# Patient Record
Sex: FEMALE | Race: WHITE | Employment: PART TIME | URBAN - METROPOLITAN AREA
[De-identification: names, ages, dates, MRNs, and addresses within clinical notes are randomized per-mention and may not be internally consistent; named-entity substitution may affect disease eponyms.]

---

## 2017-02-02 ENCOUNTER — SURG/PROC ORDERS (OUTPATIENT)
Dept: ANESTHESIOLOGY | Age: 57
End: 2017-02-02

## 2017-02-02 RX ORDER — SODIUM CHLORIDE 0.9 % (FLUSH) 0.9 %
10 SYRINGE (ML) INJECTION PRN
Status: CANCELLED | OUTPATIENT
Start: 2017-02-02

## 2017-02-02 RX ORDER — SODIUM CHLORIDE 0.9 % (FLUSH) 0.9 %
10 SYRINGE (ML) INJECTION EVERY 12 HOURS SCHEDULED
Status: CANCELLED | OUTPATIENT
Start: 2017-02-02

## 2017-02-02 RX ORDER — SODIUM CHLORIDE 9 MG/ML
INJECTION, SOLUTION INTRAVENOUS CONTINUOUS
Status: CANCELLED | OUTPATIENT
Start: 2017-02-02

## 2017-02-03 ENCOUNTER — HOSPITAL ENCOUNTER (OUTPATIENT)
Dept: ENDOSCOPY | Age: 57
Discharge: HOME | End: 2017-02-03
Attending: INTERNAL MEDICINE | Admitting: INTERNAL MEDICINE

## 2017-02-03 VITALS
DIASTOLIC BLOOD PRESSURE: 70 MMHG | OXYGEN SATURATION: 98 % | RESPIRATION RATE: 16 BRPM | HEART RATE: 63 BPM | TEMPERATURE: 98.3 F | SYSTOLIC BLOOD PRESSURE: 93 MMHG

## 2017-02-03 RX ORDER — SODIUM CHLORIDE 0.9 % (FLUSH) 0.9 %
10 SYRINGE (ML) INJECTION EVERY 12 HOURS SCHEDULED
Status: DISCONTINUED | OUTPATIENT
Start: 2017-02-03 | End: 2017-02-04 | Stop reason: HOSPADM

## 2017-02-03 RX ORDER — SODIUM CHLORIDE 0.9 % (FLUSH) 0.9 %
10 SYRINGE (ML) INJECTION PRN
Status: DISCONTINUED | OUTPATIENT
Start: 2017-02-03 | End: 2017-02-04 | Stop reason: HOSPADM

## 2017-02-03 RX ORDER — SODIUM CHLORIDE 9 MG/ML
INJECTION, SOLUTION INTRAVENOUS CONTINUOUS
Status: DISCONTINUED | OUTPATIENT
Start: 2017-02-03 | End: 2017-02-04 | Stop reason: HOSPADM

## 2017-02-03 RX ADMIN — SODIUM CHLORIDE: 9 INJECTION, SOLUTION INTRAVENOUS at 08:19

## 2017-02-03 ASSESSMENT — PAIN - FUNCTIONAL ASSESSMENT: PAIN_FUNCTIONAL_ASSESSMENT: 0-10

## 2017-02-03 ASSESSMENT — ENCOUNTER SYMPTOMS: SHORTNESS OF BREATH: 0

## 2017-02-03 ASSESSMENT — PAIN SCALES - GENERAL
PAINLEVEL_OUTOF10: 0

## 2017-05-16 ENCOUNTER — EMPLOYEE WELLNESS (OUTPATIENT)
Dept: OTHER | Age: 57
End: 2017-05-16

## 2017-05-16 LAB
CHOLESTEROL, TOTAL: 162 MG/DL (ref 0–199)
GLUCOSE BLD-MCNC: 81 MG/DL (ref 70–99)
HDLC SERPL-MCNC: 44 MG/DL (ref 40–60)
LDL CHOLESTEROL CALCULATED: 96 MG/DL
TRIGL SERPL-MCNC: 111 MG/DL (ref 0–150)

## 2017-09-12 ENCOUNTER — OFFICE VISIT (OUTPATIENT)
Dept: FAMILY MEDICINE CLINIC | Age: 57
End: 2017-09-12

## 2017-09-12 VITALS
SYSTOLIC BLOOD PRESSURE: 120 MMHG | HEART RATE: 68 BPM | BODY MASS INDEX: 28.09 KG/M2 | TEMPERATURE: 98.4 F | WEIGHT: 174.8 LBS | HEIGHT: 66 IN | DIASTOLIC BLOOD PRESSURE: 86 MMHG

## 2017-09-12 DIAGNOSIS — G47.00 INSOMNIA, UNSPECIFIED TYPE: Primary | ICD-10-CM

## 2017-09-12 DIAGNOSIS — Z86.19 HISTORY OF HEPATITIS C: ICD-10-CM

## 2017-09-12 LAB
ALBUMIN SERPL-MCNC: 4.3 G/DL (ref 3.4–5)
ALP BLD-CCNC: 77 U/L (ref 40–129)
ALT SERPL-CCNC: 26 U/L (ref 10–40)
AST SERPL-CCNC: 18 U/L (ref 15–37)
BILIRUB SERPL-MCNC: 0.3 MG/DL (ref 0–1)
BILIRUBIN DIRECT: <0.2 MG/DL (ref 0–0.3)
BILIRUBIN, INDIRECT: NORMAL MG/DL (ref 0–1)
TOTAL PROTEIN: 6.5 G/DL (ref 6.4–8.2)

## 2017-09-12 PROCEDURE — 99202 OFFICE O/P NEW SF 15 MIN: CPT | Performed by: FAMILY MEDICINE

## 2017-09-12 ASSESSMENT — ENCOUNTER SYMPTOMS
SHORTNESS OF BREATH: 0
ABDOMINAL DISTENTION: 0
ABDOMINAL PAIN: 0
BLOOD IN STOOL: 0
VOMITING: 0
NAUSEA: 0
DIARRHEA: 0
COUGH: 0
CONSTIPATION: 0
CHEST TIGHTNESS: 0

## 2017-10-04 ENCOUNTER — OFFICE VISIT (OUTPATIENT)
Dept: ORTHOPEDIC SURGERY | Age: 57
End: 2017-10-04

## 2017-10-04 VITALS
SYSTOLIC BLOOD PRESSURE: 120 MMHG | DIASTOLIC BLOOD PRESSURE: 88 MMHG | HEART RATE: 66 BPM | RESPIRATION RATE: 16 BRPM | BODY MASS INDEX: 27.8 KG/M2 | WEIGHT: 173 LBS | HEIGHT: 66 IN

## 2017-10-04 DIAGNOSIS — M25.551 HIP PAIN, ACUTE, RIGHT: Primary | ICD-10-CM

## 2017-10-04 DIAGNOSIS — M25.561 ACUTE PAIN OF RIGHT KNEE: ICD-10-CM

## 2017-10-04 DIAGNOSIS — M54.31 SCIATICA OF RIGHT SIDE: ICD-10-CM

## 2017-10-04 PROCEDURE — 99203 OFFICE O/P NEW LOW 30 MIN: CPT | Performed by: ORTHOPAEDIC SURGERY

## 2017-10-04 NOTE — MR AVS SNAPSHOT
After Visit Summary             Michelle Hsu   10/4/2017 10:15 AM   Office Visit    Description:  Female : 1960   Provider:  Britt Ramsay MD   Department:  [address] [facility]              Your Follow-Up and Future Appointments         Below is a list of your follow-up and future appointments. This may not be a complete list as you may have made appointments directly with providers that we are not aware of or your providers may have made some for you. Please call your providers to confirm appointments. It is important to keep your appointments. Please bring your current insurance card, photo ID, co-pay, and all medication bottles to your appointment. If self-pay, payment is expected at the time of service. Your To-Do List     Follow-Up    Return if symptoms worsen or fail to improve. Information from Your Visit        Department     Name Address Phone Fax    [address] [facility] 0[address] [facility]) [address] [place] [address] 38. [phone]      You Were Seen for:         Comments    Hip pain, acute, right   [[id number]]         Vital Signs     Blood Pressure Pulse Respirations Height Weight Last Menstrual Period    120/88 66 16 5' 6\" (1.676 m) 173 lb (78.5 kg) 2015    Body Mass Index Smoking Status                27.92 kg/m2 Former Smoker          Additional Information about your Body Mass Index (BMI)           Your BMI as listed above is considered overweight (25.0-29.9). BMI is an estimate of body fat, calculated from your height and weight. The higher your BMI, the greater your risk of heart disease, high blood pressure, type 2 diabetes, stroke, gallstones, arthritis, sleep apnea, and certain cancers. BMI is not perfect. It may overestimate body fat in athletes and people who are more muscular.   If your body fat is high you can improve

## 2017-10-05 RX ORDER — METHYLPREDNISOLONE 4 MG/1
TABLET ORAL
Qty: 1 KIT | Refills: 0 | Status: SHIPPED | OUTPATIENT
Start: 2017-10-05 | End: 2017-10-10

## 2018-01-30 ENCOUNTER — TELEPHONE (OUTPATIENT)
Dept: FAMILY MEDICINE CLINIC | Age: 58
End: 2018-01-30

## 2018-01-30 DIAGNOSIS — G47.00 INSOMNIA, UNSPECIFIED TYPE: Primary | ICD-10-CM

## 2018-01-30 NOTE — TELEPHONE ENCOUNTER
I am not able to refill a request for the sleeping pill  She is getting that and other medications for sedation from other doctors  She will need to contact them

## 2018-01-31 RX ORDER — ESZOPICLONE 2 MG/1
2 TABLET, FILM COATED ORAL NIGHTLY
Qty: 30 TABLET | Refills: 1 | Status: SHIPPED | OUTPATIENT
Start: 2018-01-31 | End: 2018-04-27 | Stop reason: SDUPTHER

## 2018-01-31 NOTE — TELEPHONE ENCOUNTER
Med refilled    Pharmacy called  She had refills left on her script  Noted on the [place] record

## 2018-02-06 ENCOUNTER — TELEPHONE (OUTPATIENT)
Dept: FAMILY MEDICINE CLINIC | Age: 58
End: 2018-02-06

## 2018-02-06 NOTE — TELEPHONE ENCOUNTER
Prescription Question     From  Caroline Mccartney To Sent  2/6/2018  3:40 PM   I would like to request that all of my prescription refills and any new prescriptions be sent to [facility].    E-Prescription: [facility] ([facility]"   Fax: [phone]   Phone: [phone] or [phone]   Thank You,   Caroline Mccartney

## 2018-02-08 ENCOUNTER — OFFICE VISIT (OUTPATIENT)
Dept: ORTHOPEDIC SURGERY | Age: 58
End: 2018-02-08

## 2018-02-08 VITALS
DIASTOLIC BLOOD PRESSURE: 71 MMHG | SYSTOLIC BLOOD PRESSURE: 109 MMHG | WEIGHT: 179 LBS | HEART RATE: 76 BPM | RESPIRATION RATE: 12 BRPM | BODY MASS INDEX: 28.77 KG/M2 | HEIGHT: 66 IN

## 2018-02-08 DIAGNOSIS — M25.562 ACUTE PAIN OF LEFT KNEE: ICD-10-CM

## 2018-02-08 DIAGNOSIS — M22.42 CHONDROMALACIA PATELLA, LEFT: Primary | ICD-10-CM

## 2018-02-08 DIAGNOSIS — S83.282A TEAR OF LATERAL MENISCUS OF LEFT KNEE, CURRENT, UNSPECIFIED TEAR TYPE, INITIAL ENCOUNTER: ICD-10-CM

## 2018-02-08 PROCEDURE — 20610 DRAIN/INJ JOINT/BURSA W/O US: CPT | Performed by: ORTHOPAEDIC SURGERY

## 2018-02-08 PROCEDURE — 99214 OFFICE O/P EST MOD 30 MIN: CPT | Performed by: ORTHOPAEDIC SURGERY

## 2018-02-08 RX ORDER — TRAMADOL HYDROCHLORIDE 50 MG/1
50 TABLET ORAL DAILY
COMMUNITY
End: 2020-03-02 | Stop reason: ALTCHOICE

## 2018-02-08 ASSESSMENT — ENCOUNTER SYMPTOMS
RESPIRATORY NEGATIVE: 1
ALLERGIC/IMMUNOLOGIC NEGATIVE: 1
GASTROINTESTINAL NEGATIVE: 1

## 2018-02-08 NOTE — PROGRESS NOTES
Subjective:      Patient ID: Kristina Mercado is a 63 y.o. female. HPI   Kristina Mercado presents today for evaluation of her left knee. 6 days ago she was trying to do a sit up on an exercise ball when she fell off twisting her left knee awkwardly. She had significant popping and pain laterally. She has use diclofenac and ice. She has also taken some tramadol. She continues to have pain. Pain is 3 out of 10. She was seen by physical therapist and exercises were recommended. Those have helped a little bit. She has significant pain extending the knee when she descends stairs. She has not had significant prior left knee pain. She is otherwise healthy, but does have a history of hepatitis C. Review of Systems   Constitutional: Negative. HENT: Negative. Eyes: Positive for visual disturbance. Cataracts, is being seen   Respiratory: Negative. Cardiovascular: Negative. Gastrointestinal: Negative. Endocrine: Negative. Genitourinary: Negative. Musculoskeletal: Negative. Allergic/Immunologic: Negative. Neurological: Negative. Hematological: Negative. Psychiatric/Behavioral: Positive for sleep disturbance. Medicated for insomnia       Objective:   Physical Exam  General Exam:    Vitals: Blood pressure 109/71, pulse 76, resp. rate 12, height 5' 6\" (1.676 m), weight 179 lb (81.2 kg), last menstrual period 04/21/2015. Constitutional: Patient is adequately groomed with no evidence of malnutrition  Mental Status: The patient is oriented to time, place and person. The patient's mood and affect are appropriate. Gait:  Patient walks with normal gait and station. Lymphatic: The lymphatic examination bilaterally reveals all areas to be without enlargement or induration. Vascular: Examination reveals no swelling or calf tenderness. Peripheral pulses are palpable and 2+. Neurological: The patient has good coordination. There is no weakness or sensory deficit.     Skin: Head/Neck: inspection reveals no rashes, ulcerations or lesions. Trunk:  inspection reveals no rashes, ulcerations or lesions. Right Lower Extremity: inspection reveals no rashes, ulcerations or lesions. Left Lower Extremity: inspection reveals no rashes, ulcerations or lesions. Examination of the bilateral hips reveals normal flexion and extension. There is no restriction in rotation. There is no tenderness to palpation anteriorly posteriorly or laterally. Right knee examination demonstrates no effusion. There is no tenderness to palpation over the medial or lateral joint line. There is no discomfort over the patellar tendon. There is no palpable popliteal cyst.  Sensation is intact. Range of motion is normal.  There is no patellofemoral crepitation. There is no instability to varus or  valgus stress applied at 0, 30, 60, or 90° of flexion. There is no anterior or posterior drawer. Lachman examination is normal.  Left knee exam demonstrates no effusion. She is moderate to significant patellofemoral crepitation. She has some mild tenderness laterally. She has pain at terminal knee flexion but no pain and extension. She is mild discomfort with Jazmin's maneuver laterally. She is ligamentously stable throughout. Calf is soft without DVT. X-rays were obtained today. AP standing, PA flexed, and merchant views of the bilateral knees as well as a lateral of the left knee. These demonstrate:  Mild patellofemoral narrowing but no other acute bony abnormalities    Assessment:      Left knee chondromalacia patella versus left knee lateral meniscus tear      Plan: We are going to start with conservative management. We will try cortisone injection. She'll continue with the exercises as directed by her physical therapist.  If her pain is persistent in 1 month we'll proceed with an MRI to evaluate lateral meniscus. She agrees with this plan.     Procedure: Under sterile technique, the left

## 2018-02-16 ENCOUNTER — TELEPHONE (OUTPATIENT)
Dept: FAMILY MEDICINE CLINIC | Age: 58
End: 2018-02-16

## 2018-02-16 NOTE — TELEPHONE ENCOUNTER
Kim Black  Female, 63 y. o., 1960  Weight:   179 lb (81.2 kg)  Phone:   *OTHER [phone]; R:[phone]; U:[phone]  [address] [place] [address],[address] Phone:   [phone]  PCP:   Adarsh Dc MD  Allergies  No Known Allergies  Health Maintenance:   Due  87 Rulashawn Mcconnell Patient (PHP)  Primary Ins:   MEDICAL  MRN:   [id number]  MyChart: Active  Next Appt:   None  Pt Comm Pref:   MyChart [2]  Prescription Question     From  Vidmind To Sent  2018  1:28 PM   recently changed my pharmacy from  [facility] to [facility] sent them all of my information but they said they need my physician to send them all of my prescriptions so they can start the process of filling them for 90 days. I take diclfenac 75mg bid, and eszopiclone 2 to 3 mg at bedtime as well as Tramadol 50 mg 1-2 q12h prn pain. I have only seen Dr. Analy Luke one time, that was to get established because my former pcp was retiring. I gave him all of the information on my current meds at that time so it should be in my chart. I have only 2 weeks left on my diclofenac and when I spoke to [facility] she said that should be ample time to get it filled and mailed out. She also said that they like to start all meds at the same time (since I'm  new to them) that way everything is due at the same time. This will be for all maintenance meds, anything that needs filled right away can still go to [facility]. Thank you for your consideration. Cih Gaetano    Encounter Messages     Read Composed From To Subject   Y 2018  1:28 PM Karo Wiggins MD Prescription Question      Prescription Question     Karo Wiggins MD 56 minutes ago (1:28 PM)         recently changed my pharmacy from [facility] to [facility].  I sent them all of my information but they said they need my physician to send them all of my prescriptions so they can start the process of filling them for 90 days.  I take

## 2018-02-22 ENCOUNTER — TELEPHONE (OUTPATIENT)
Dept: FAMILY MEDICINE CLINIC | Age: 58
End: 2018-02-22

## 2018-02-22 RX ORDER — DICLOFENAC SODIUM 75 MG/1
75 TABLET, DELAYED RELEASE ORAL 2 TIMES DAILY WITH MEALS
Qty: 180 TABLET | Refills: 0 | Status: SHIPPED | OUTPATIENT
Start: 2018-02-22 | End: 2018-06-20 | Stop reason: SDUPTHER

## 2018-02-22 NOTE — TELEPHONE ENCOUNTER
Prescription Question     From  Sammy Mcnair To Sent  2/22/2018  1:48 PM   Could you please let Sunitha Silva know that I need a refill on my diclofenac. I called them and they said they had not received anything from your office as of yet. I am leaving for vacation next Wednesday and will be gone for about two and a half weeks.

## 2018-03-20 VITALS — WEIGHT: 170 LBS | BODY MASS INDEX: 27.44 KG/M2

## 2018-04-27 ENCOUNTER — CLINICAL DOCUMENTATION (OUTPATIENT)
Dept: FAMILY MEDICINE CLINIC | Age: 58
End: 2018-04-27

## 2018-04-27 DIAGNOSIS — G47.00 INSOMNIA, UNSPECIFIED TYPE: ICD-10-CM

## 2018-04-27 RX ORDER — ESZOPICLONE 2 MG/1
2 TABLET, FILM COATED ORAL NIGHTLY
Qty: 30 TABLET | Refills: 1 | Status: SHIPPED | OUTPATIENT
Start: 2018-04-27 | End: 2018-06-26

## 2018-04-27 RX ORDER — ESZOPICLONE 2 MG/1
TABLET, FILM COATED ORAL
Qty: 30 TABLET | Refills: 0 | OUTPATIENT
Start: 2018-04-27

## 2018-05-24 ENCOUNTER — EMPLOYEE WELLNESS (OUTPATIENT)
Dept: OTHER | Age: 58
End: 2018-05-24

## 2018-05-24 LAB
CHOLESTEROL, TOTAL: 179 MG/DL (ref 0–199)
GLUCOSE BLD-MCNC: 76 MG/DL (ref 70–99)
HDLC SERPL-MCNC: 51 MG/DL (ref 40–60)
LDL CHOLESTEROL CALCULATED: 110 MG/DL
TRIGL SERPL-MCNC: 92 MG/DL (ref 0–150)

## 2018-05-29 VITALS — BODY MASS INDEX: 28.41 KG/M2 | WEIGHT: 176 LBS

## 2018-06-20 RX ORDER — DICLOFENAC SODIUM 75 MG/1
TABLET, DELAYED RELEASE ORAL
Qty: 180 TABLET | Refills: 0 | Status: SHIPPED | OUTPATIENT
Start: 2018-06-20 | End: 2018-10-02 | Stop reason: SDUPTHER

## 2018-07-13 ENCOUNTER — OFFICE VISIT (OUTPATIENT)
Dept: ORTHOPEDIC SURGERY | Age: 58
End: 2018-07-13

## 2018-07-13 ENCOUNTER — TELEPHONE (OUTPATIENT)
Dept: ORTHOPEDIC SURGERY | Age: 58
End: 2018-07-13

## 2018-07-13 VITALS
DIASTOLIC BLOOD PRESSURE: 63 MMHG | HEART RATE: 60 BPM | SYSTOLIC BLOOD PRESSURE: 116 MMHG | RESPIRATION RATE: 16 BRPM | BODY MASS INDEX: 28.77 KG/M2 | HEIGHT: 66 IN | WEIGHT: 179 LBS

## 2018-07-13 DIAGNOSIS — M70.61 TROCHANTERIC BURSITIS OF RIGHT HIP: Primary | ICD-10-CM

## 2018-07-13 PROCEDURE — 99214 OFFICE O/P EST MOD 30 MIN: CPT | Performed by: ORTHOPAEDIC SURGERY

## 2018-07-13 PROCEDURE — 20610 DRAIN/INJ JOINT/BURSA W/O US: CPT | Performed by: ORTHOPAEDIC SURGERY

## 2018-07-13 RX ORDER — METHYLPREDNISOLONE 4 MG/1
TABLET ORAL
Qty: 1 KIT | Refills: 0 | Status: SHIPPED | OUTPATIENT
Start: 2018-07-13 | End: 2018-07-19

## 2018-07-24 ENCOUNTER — TELEPHONE (OUTPATIENT)
Dept: FAMILY MEDICINE CLINIC | Age: 58
End: 2018-07-24

## 2018-07-24 RX ORDER — ESZOPICLONE 2 MG/1
TABLET, FILM COATED ORAL
Qty: 30 TABLET | Refills: 0 | OUTPATIENT
Start: 2018-07-24

## 2018-07-24 NOTE — TELEPHONE ENCOUNTER
Prescription Question     From  Andra Quiles To  Psychiatric Practice Staff Sent  7/24/2018  8:31 AM   I need a refill on my eszopiclone. I have about 3 or 4 left.    Thank you,     Lamar Sarmiento

## 2018-07-26 PROBLEM — M70.61 TROCHANTERIC BURSITIS OF RIGHT HIP: Status: ACTIVE | Noted: 2018-07-26

## 2018-07-26 NOTE — PROGRESS NOTES
CHIEF COMPLAINT: Right lateral hip pain/trochanetric busritis. HISTORY:  Ms. Mariam Minor 63 y.o.  female presents today for evaluation of right lateral hip pain which started 2017.  She is complaining of achy pain. Pain is increase with standing and walking and decrease with rest. Pain is sharp early in the morning with first few steps, dull achy pain by the end of the day. No radiation and no numbness and tingling sensation. No other complaint. No h/o trauma or gout. She is a RN at [employer].  The patient is known to me for right sciatica, seen in 10/2017     Past Medical History:   Diagnosis Date    Hepatitis     Right tennis elbow     Trochanteric bursitis of right hip 7/26/2018       Past Surgical History:   Procedure Laterality Date    COLONOSCOPY  02/03/2017    polyp dr Nithin He repeat 5 years    TUBAL LIGATION      WISDOM TOOTH EXTRACTION         Social History     Social History    Marital status:      Spouse name: N/A    Number of children: N/A    Years of education: N/A     Occupational History    RN      Social History Main Topics    Smoking status: Former Smoker     Start date: 9/12/1976     Quit date: 9/12/1990    Smokeless tobacco: Never Used    Alcohol use No    Drug use: No    Sexual activity: Yes     Partners: Male     Other Topics Concern    Not on file     Social History Narrative    No narrative on file       Family History   Problem Relation Age of Onset    Cancer Mother         breast    Heart Disease Mother     Cancer Father         lung    Arthritis Other     Asthma Other     Cancer Brother         esophageal    Diabetes Brother     Cancer Maternal Grandfather         lung     No Known Problems Sister     No Known Problems Maternal Aunt     No Known Problems Maternal Uncle     No Known Problems Paternal Aunt     No Known Problems Paternal Uncle     No Known Problems Maternal Grandmother     No Known Problems Paternal Grandmother     No Known minimal degenerative changes, no fracture. IMPRESSION: Right trochanteric bursitis. PLAN: I discussed with the patient the treatment options including both surgical and non-surgical treatment. We recommended stretching exercises of the calf and hamstrings which was taught to the patient today. She will take NSAIDS PRN. I believe she will benefit from cortisone injection right hip trochanteric bursa, and she agreed to have. PROCEDURE:    With the patient's permission, and under sterile condition, the right hip trochanteric bursitis area was prepared and draped with alcohol and injected with a mixture of 1 ml Kenalog 40mg and 4 ml of 1% lidocaine. The patient tolerated the procedure well. A band-aid was applied and the patient was advised to ice the big toe for 15-20 minutes to relieve any injection site related pain. She reported a good improvement immediatly after the injection. For her sciatica, she would like to try Meds, and we will give Medrol dose pack. F/U with our spine team.     F/u in 2-3 months, PT if needed.        Jerrell Calhoun MD

## 2018-07-27 ENCOUNTER — OFFICE VISIT (OUTPATIENT)
Dept: FAMILY MEDICINE CLINIC | Age: 58
End: 2018-07-27

## 2018-07-27 VITALS
HEIGHT: 66 IN | BODY MASS INDEX: 27.1 KG/M2 | TEMPERATURE: 98.1 F | DIASTOLIC BLOOD PRESSURE: 74 MMHG | SYSTOLIC BLOOD PRESSURE: 116 MMHG | WEIGHT: 168.6 LBS

## 2018-07-27 DIAGNOSIS — Z86.19 HISTORY OF HEPATITIS C: ICD-10-CM

## 2018-07-27 DIAGNOSIS — N95.0 POSTMENOPAUSAL BLEEDING: ICD-10-CM

## 2018-07-27 DIAGNOSIS — G47.00 INSOMNIA, UNSPECIFIED TYPE: Primary | ICD-10-CM

## 2018-07-27 LAB
ALBUMIN SERPL-MCNC: 4.4 G/DL (ref 3.4–5)
ALP BLD-CCNC: 90 U/L (ref 40–129)
ALT SERPL-CCNC: 43 U/L (ref 10–40)
AST SERPL-CCNC: 19 U/L (ref 15–37)
BILIRUB SERPL-MCNC: 0.5 MG/DL (ref 0–1)
BILIRUBIN DIRECT: <0.2 MG/DL (ref 0–0.3)
BILIRUBIN URINE: NEGATIVE
BILIRUBIN, INDIRECT: ABNORMAL MG/DL (ref 0–1)
BLOOD, URINE: NEGATIVE
CLARITY: CLEAR
COLOR: YELLOW
EPITHELIAL CELLS, UA: 0 /HPF (ref 0–5)
GLUCOSE URINE: NEGATIVE MG/DL
HYALINE CASTS: 0 /LPF (ref 0–8)
KETONES, URINE: NEGATIVE MG/DL
LEUKOCYTE ESTERASE, URINE: NEGATIVE
MICROSCOPIC EXAMINATION: NORMAL
NITRITE, URINE: NEGATIVE
PH UA: 7
PROTEIN UA: NEGATIVE MG/DL
RBC UA: 3 /HPF (ref 0–4)
SPECIFIC GRAVITY UA: 1.01
TOTAL PROTEIN: 6.4 G/DL (ref 6.4–8.2)
UROBILINOGEN, URINE: 0.2 E.U./DL
WBC UA: 0 /HPF (ref 0–5)

## 2018-07-27 PROCEDURE — 99213 OFFICE O/P EST LOW 20 MIN: CPT | Performed by: FAMILY MEDICINE

## 2018-07-27 RX ORDER — ESZOPICLONE 2 MG/1
2 TABLET, FILM COATED ORAL NIGHTLY
Qty: 90 TABLET | Refills: 0 | Status: SHIPPED | OUTPATIENT
Start: 2018-07-27 | End: 2018-10-30 | Stop reason: SDUPTHER

## 2018-07-27 RX ORDER — ESZOPICLONE 2 MG/1
2 TABLET, FILM COATED ORAL NIGHTLY
COMMUNITY
End: 2018-07-27 | Stop reason: SDUPTHER

## 2018-07-27 ASSESSMENT — ENCOUNTER SYMPTOMS
CHEST TIGHTNESS: 0
VOMITING: 0
ABDOMINAL PAIN: 0
NAUSEA: 0
DIARRHEA: 0
CONSTIPATION: 0
ABDOMINAL DISTENTION: 0
SHORTNESS OF BREATH: 0
BLOOD IN STOOL: 0

## 2018-07-27 ASSESSMENT — PATIENT HEALTH QUESTIONNAIRE - PHQ9
SUM OF ALL RESPONSES TO PHQ9 QUESTIONS 1 & 2: 0
1. LITTLE INTEREST OR PLEASURE IN DOING THINGS: 0
SUM OF ALL RESPONSES TO PHQ QUESTIONS 1-9: 0
2. FEELING DOWN, DEPRESSED OR HOPELESS: 0

## 2018-08-03 ENCOUNTER — HOSPITAL ENCOUNTER (OUTPATIENT)
Dept: WOMENS IMAGING | Age: 58
Discharge: HOME | End: 2018-08-03
Attending: FAMILY MEDICINE | Admitting: FAMILY MEDICINE

## 2018-08-03 DIAGNOSIS — Z12.39 BREAST CANCER SCREENING: ICD-10-CM

## 2018-08-23 ENCOUNTER — OFFICE VISIT (OUTPATIENT)
Dept: FAMILY MEDICINE CLINIC | Age: 58
End: 2018-08-23

## 2018-08-23 VITALS
DIASTOLIC BLOOD PRESSURE: 84 MMHG | BODY MASS INDEX: 26.61 KG/M2 | SYSTOLIC BLOOD PRESSURE: 128 MMHG | HEIGHT: 66 IN | TEMPERATURE: 98.1 F | WEIGHT: 165.6 LBS

## 2018-08-23 DIAGNOSIS — R21 RASH: Primary | ICD-10-CM

## 2018-08-23 PROCEDURE — 99212 OFFICE O/P EST SF 10 MIN: CPT | Performed by: FAMILY MEDICINE

## 2018-08-23 RX ORDER — METHYLPREDNISOLONE 4 MG/1
TABLET ORAL
Qty: 1 KIT | Refills: 0 | Status: SHIPPED | OUTPATIENT
Start: 2018-08-23 | End: 2018-08-29

## 2018-08-23 NOTE — PROGRESS NOTES
Subjective:      Patient ID: Bunny Wan is a 63 y.o. female. Patient presents with:  Rash: itchy rash on both arms x 6 days    She is with the above  Has had issues with sun and rash in the past  No expossure no drugs new products  No fever    YOB: 1960    Date of Visit:  8/23/2018    No Known Allergies    Current Outpatient Prescriptions:  TURMERIC PO, Take by mouth, Disp: , Rfl:   eszopiclone (LUNESTA) 2 MG TABS, Take 1 tablet by mouth nightly for 90 days. ., Disp: 90 tablet, Rfl: 0  diclofenac (VOLTAREN) 75 MG EC tablet, TAKE ONE TABLET BY MOUTH TWICE A DAY WITH MEALS. IF DOING WELL, MAY TAKE JUST ONE TABLET PER DAY., Disp: 180 tablet, Rfl: 0  traMADol (ULTRAM) 50 MG tablet, Take 50 mg by mouth daily. , Disp: , Rfl:   Cholecalciferol (VITAMIN D3) 2000 UNITS CAPS, Take 1,000 Units by mouth 2 times daily, Disp: , Rfl:   zinc gluconate 50 MG tablet, Take 50 mg by mouth daily, Disp: , Rfl:   Lysine 500 MG TABS, Take 500 mg by mouth 2 times daily, Disp: , Rfl:   Multiple Vitamins-Minerals (HAIR SKIN AND NAILS FORMULA PO), Take 1 tablet by mouth 3 times daily, Disp: , Rfl:     No current facility-administered medications for this visit.       ---------------------------------                  08/23/18                            1521            ---------------------------------   BP:             128/84            Site:          Left Arm           Position:        Sitting           Cuff Size:     Medium Adult         Temp:      98.1 °F (36.7 °C)      TempSrc:         Oral             Weight: 165 lb 9.6 oz (75.1 kg)   Height:     5' 6\" (1.676 m)      ---------------------------------  Body mass index is 26.73 kg/m².      Wt Readings from Last 3 Encounters:  08/23/18 : 165 lb 9.6 oz (75.1 kg)  07/27/18 : 168 lb 9.6 oz (76.5 kg)  07/13/18 : 179 lb (81.2 kg)    BP Readings from Last 3 Encounters:  08/23/18 : 128/84  07/27/18 : 116/74  07/13/18 : 116/63              Review

## 2018-10-04 RX ORDER — DICLOFENAC SODIUM 75 MG/1
TABLET, DELAYED RELEASE ORAL
Qty: 180 TABLET | Refills: 1 | Status: SHIPPED | OUTPATIENT
Start: 2018-10-04 | End: 2019-04-29 | Stop reason: SDUPTHER

## 2018-10-30 ENCOUNTER — TELEPHONE (OUTPATIENT)
Dept: FAMILY MEDICINE CLINIC | Age: 58
End: 2018-10-30

## 2018-10-30 DIAGNOSIS — G47.00 INSOMNIA, UNSPECIFIED TYPE: ICD-10-CM

## 2018-10-30 RX ORDER — ESZOPICLONE 2 MG/1
2 TABLET, FILM COATED ORAL NIGHTLY
Qty: 90 TABLET | Refills: 0 | Status: SHIPPED | OUTPATIENT
Start: 2018-10-30 | End: 2019-01-28

## 2019-01-28 ENCOUNTER — OFFICE VISIT (OUTPATIENT)
Dept: FAMILY MEDICINE CLINIC | Age: 59
End: 2019-01-28
Payer: COMMERCIAL

## 2019-01-28 VITALS
HEIGHT: 66 IN | SYSTOLIC BLOOD PRESSURE: 116 MMHG | BODY MASS INDEX: 26.68 KG/M2 | TEMPERATURE: 98.1 F | WEIGHT: 166 LBS | HEART RATE: 72 BPM | DIASTOLIC BLOOD PRESSURE: 70 MMHG

## 2019-01-28 DIAGNOSIS — R74.8 ELEVATED LIVER ENZYMES: ICD-10-CM

## 2019-01-28 DIAGNOSIS — G47.00 INSOMNIA, UNSPECIFIED TYPE: Primary | ICD-10-CM

## 2019-01-28 LAB
ALBUMIN SERPL-MCNC: 4.2 G/DL (ref 3.4–5)
ALP BLD-CCNC: 84 U/L (ref 40–129)
ALT SERPL-CCNC: 27 U/L (ref 10–40)
AST SERPL-CCNC: 19 U/L (ref 15–37)
BILIRUB SERPL-MCNC: 0.4 MG/DL (ref 0–1)
BILIRUBIN DIRECT: <0.2 MG/DL (ref 0–0.3)
BILIRUBIN, INDIRECT: NORMAL MG/DL (ref 0–1)
TOTAL PROTEIN: 6.8 G/DL (ref 6.4–8.2)

## 2019-01-28 PROCEDURE — 99213 OFFICE O/P EST LOW 20 MIN: CPT | Performed by: FAMILY MEDICINE

## 2019-01-28 ASSESSMENT — ENCOUNTER SYMPTOMS
DIARRHEA: 0
NAUSEA: 0
CONSTIPATION: 0
CHEST TIGHTNESS: 0
VOMITING: 0
BLOOD IN STOOL: 0
ABDOMINAL PAIN: 0
SHORTNESS OF BREATH: 0
ABDOMINAL DISTENTION: 0

## 2019-01-29 ENCOUNTER — TELEPHONE (OUTPATIENT)
Dept: FAMILY MEDICINE CLINIC | Age: 59
End: 2019-01-29

## 2019-01-29 RX ORDER — AMOXICILLIN 500 MG/1
500 CAPSULE ORAL 3 TIMES DAILY
Qty: 30 CAPSULE | Refills: 0 | Status: SHIPPED | OUTPATIENT
Start: 2019-01-29 | End: 2019-02-08

## 2019-02-19 ENCOUNTER — OFFICE VISIT (OUTPATIENT)
Dept: ORTHOPEDIC SURGERY | Age: 59
End: 2019-02-19
Payer: COMMERCIAL

## 2019-02-19 VITALS
RESPIRATION RATE: 16 BRPM | BODY MASS INDEX: 26.68 KG/M2 | DIASTOLIC BLOOD PRESSURE: 77 MMHG | HEART RATE: 73 BPM | HEIGHT: 66 IN | WEIGHT: 166 LBS | SYSTOLIC BLOOD PRESSURE: 130 MMHG

## 2019-02-19 DIAGNOSIS — M70.61 TROCHANTERIC BURSITIS OF RIGHT HIP: Primary | ICD-10-CM

## 2019-02-19 PROCEDURE — 20610 DRAIN/INJ JOINT/BURSA W/O US: CPT | Performed by: ORTHOPAEDIC SURGERY

## 2019-02-19 PROCEDURE — 99213 OFFICE O/P EST LOW 20 MIN: CPT | Performed by: ORTHOPAEDIC SURGERY

## 2019-02-23 DIAGNOSIS — G47.00 INSOMNIA, UNSPECIFIED TYPE: Primary | ICD-10-CM

## 2019-02-25 RX ORDER — ESZOPICLONE 2 MG/1
TABLET, FILM COATED ORAL
Qty: 30 TABLET | Refills: 1 | Status: SHIPPED | OUTPATIENT
Start: 2019-02-25 | End: 2019-04-26 | Stop reason: SDUPTHER

## 2019-04-26 ENCOUNTER — TELEPHONE (OUTPATIENT)
Dept: FAMILY MEDICINE CLINIC | Age: 59
End: 2019-04-26

## 2019-04-26 DIAGNOSIS — G47.00 INSOMNIA, UNSPECIFIED TYPE: ICD-10-CM

## 2019-04-26 RX ORDER — ESZOPICLONE 2 MG/1
2 TABLET, FILM COATED ORAL NIGHTLY
Qty: 30 TABLET | Refills: 2 | Status: SHIPPED | OUTPATIENT
Start: 2019-04-26 | End: 2019-08-02 | Stop reason: SDUPTHER

## 2019-04-29 RX ORDER — DICLOFENAC SODIUM 75 MG/1
TABLET, DELAYED RELEASE ORAL
Qty: 180 TABLET | Refills: 1 | Status: SHIPPED | OUTPATIENT
Start: 2019-04-29 | End: 2019-11-20 | Stop reason: SDUPTHER

## 2019-04-30 LAB
CHOLESTEROL, TOTAL: 148 MG/DL (ref 0–199)
GLUCOSE BLD-MCNC: 82 MG/DL (ref 70–99)
HDLC SERPL-MCNC: 46 MG/DL (ref 40–60)
LDL CHOLESTEROL CALCULATED: 88 MG/DL
TRIGL SERPL-MCNC: 70 MG/DL (ref 0–150)

## 2019-08-02 ENCOUNTER — TELEPHONE (OUTPATIENT)
Dept: INTERNAL MEDICINE CLINIC | Age: 59
End: 2019-08-02

## 2019-08-02 DIAGNOSIS — G47.00 INSOMNIA, UNSPECIFIED TYPE: ICD-10-CM

## 2019-08-02 RX ORDER — ESZOPICLONE 2 MG/1
2 TABLET, FILM COATED ORAL NIGHTLY
Qty: 30 TABLET | Refills: 2 | Status: SHIPPED | OUTPATIENT
Start: 2019-08-02 | End: 2019-10-31

## 2019-11-29 DIAGNOSIS — G47.00 INSOMNIA, UNSPECIFIED TYPE: Primary | ICD-10-CM

## 2019-12-02 RX ORDER — ESZOPICLONE 2 MG/1
TABLET, FILM COATED ORAL
Qty: 30 TABLET | Refills: 1 | Status: SHIPPED | OUTPATIENT
Start: 2019-12-02 | End: 2020-02-10

## 2019-12-30 ENCOUNTER — HOSPITAL ENCOUNTER (OUTPATIENT)
Dept: WOMENS IMAGING | Age: 59
Discharge: HOME | End: 2019-12-30
Payer: COMMERCIAL

## 2019-12-30 DIAGNOSIS — Z12.31 BREAST CANCER SCREENING BY MAMMOGRAM: ICD-10-CM

## 2019-12-30 PROCEDURE — 77067 SCR MAMMO BI INCL CAD: CPT

## 2020-02-03 RX ORDER — ESZOPICLONE 2 MG/1
TABLET, FILM COATED ORAL
Qty: 30 TABLET | Refills: 0 | OUTPATIENT
Start: 2020-02-03

## 2020-02-03 RX ORDER — DICLOFENAC SODIUM 75 MG/1
TABLET, DELAYED RELEASE ORAL
Qty: 180 TABLET | Refills: 1 | Status: SHIPPED | OUTPATIENT
Start: 2020-02-03 | End: 2020-09-24

## 2020-02-03 NOTE — TELEPHONE ENCOUNTER
[phone]  - Left [facility] for Jayda Rodriguez to return call to schedule appt.

## 2020-02-10 ENCOUNTER — OFFICE VISIT (OUTPATIENT)
Dept: FAMILY MEDICINE CLINIC | Age: 60
End: 2020-02-10
Payer: COMMERCIAL

## 2020-02-10 VITALS
DIASTOLIC BLOOD PRESSURE: 84 MMHG | BODY MASS INDEX: 29.57 KG/M2 | HEIGHT: 66 IN | TEMPERATURE: 98.3 F | SYSTOLIC BLOOD PRESSURE: 122 MMHG | WEIGHT: 184 LBS

## 2020-02-10 PROCEDURE — 93000 ELECTROCARDIOGRAM COMPLETE: CPT | Performed by: FAMILY MEDICINE

## 2020-02-10 PROCEDURE — 99213 OFFICE O/P EST LOW 20 MIN: CPT | Performed by: FAMILY MEDICINE

## 2020-02-10 RX ORDER — HYDROXYZINE PAMOATE 25 MG/1
25 CAPSULE ORAL NIGHTLY PRN
Qty: 30 CAPSULE | Refills: 0 | Status: SHIPPED | OUTPATIENT
Start: 2020-02-10 | End: 2020-03-09

## 2020-02-10 RX ORDER — ESZOPICLONE 3 MG/1
3 TABLET, FILM COATED ORAL NIGHTLY
Qty: 30 TABLET | Refills: 1 | Status: SHIPPED | OUTPATIENT
Start: 2020-02-10 | End: 2020-04-16 | Stop reason: SDUPTHER

## 2020-02-10 ASSESSMENT — PATIENT HEALTH QUESTIONNAIRE - PHQ9
SUM OF ALL RESPONSES TO PHQ QUESTIONS 1-9: 0
SUM OF ALL RESPONSES TO PHQ9 QUESTIONS 1 & 2: 0
1. LITTLE INTEREST OR PLEASURE IN DOING THINGS: 0
SUM OF ALL RESPONSES TO PHQ QUESTIONS 1-9: 0
2. FEELING DOWN, DEPRESSED OR HOPELESS: 0

## 2020-02-10 NOTE — PROGRESS NOTES
Units by mouth 2 times daily, Disp: , Rfl:     zinc gluconate 50 MG tablet, Take 50 mg by mouth daily, Disp: , Rfl:     Lysine 500 MG TABS, Take 500 mg by mouth 2 times daily, Disp: , Rfl:     Multiple Vitamins-Minerals (HAIR SKIN AND NAILS FORMULA PO), Take 1 tablet by mouth 3 times daily, Disp: , Rfl:           Review of Systems    Objective:   Physical Exam  Constitutional:       General: She is not in acute distress. Appearance: Normal appearance. She is well-developed. She is not ill-appearing or diaphoretic. Eyes:      General: No scleral icterus. Neck:      Musculoskeletal: Neck supple. Thyroid: No thyroid mass or thyromegaly. Cardiovascular:      Rate and Rhythm: Normal rate and regular rhythm. Heart sounds: Normal heart sounds. No murmur. No friction rub. No gallop. Comments:     Pulmonary:      Effort: Pulmonary effort is normal. No tachypnea, accessory muscle usage or respiratory distress. Breath sounds: Normal breath sounds. No decreased breath sounds, wheezing, rhonchi or rales. Abdominal:      General: Bowel sounds are normal. There is no distension or abdominal bruit. Palpations: Abdomen is soft. There is no hepatomegaly, splenomegaly, mass or pulsatile mass. Tenderness: There is no abdominal tenderness. There is no guarding. Lymphadenopathy:      Cervical: No cervical adenopathy. Upper Body:      Right upper body: No supraclavicular adenopathy. Left upper body: No supraclavicular adenopathy. Skin:     General: Skin is warm and dry. Coloration: Skin is not pale. Nails: There is no clubbing. Neurological:      Mental Status: She is alert. Psychiatric:         Mood and Affect: Mood normal.         Speech: Speech normal.         Assessment:        Diagnosis Orders   1. Insomnia, unspecified type  eszopiclone (LUNESTA) 3 MG TABS   2.  Chest pain, unspecified type  EKG 12 Lead    Arlet Garcia MD, Cardiology, [facility]     ekg no acute changes sr  Orders Placed This Encounter   Medications    hydrOXYzine (VISTARIL) 25 MG capsule     Sig: Take 1 capsule by mouth nightly as needed (sleep)     Dispense:  30 capsule     Refill:  0    eszopiclone (LUNESTA) 3 MG TABS     Sig: Take 1 tablet by mouth nightly for 60 days.      Dispense:  30 tablet     Refill:  1           Plan:      See christina Wolf for the pain  If worse to the ER        Gloria Frazier MD

## 2020-03-02 ENCOUNTER — OFFICE VISIT (OUTPATIENT)
Dept: CARDIOLOGY CLINIC | Age: 60
End: 2020-03-02
Payer: COMMERCIAL

## 2020-03-02 VITALS
HEIGHT: 66 IN | BODY MASS INDEX: 29.09 KG/M2 | DIASTOLIC BLOOD PRESSURE: 80 MMHG | OXYGEN SATURATION: 96 % | SYSTOLIC BLOOD PRESSURE: 124 MMHG | WEIGHT: 181 LBS

## 2020-03-02 PROCEDURE — 99203 OFFICE O/P NEW LOW 30 MIN: CPT | Performed by: INTERNAL MEDICINE

## 2020-03-02 NOTE — PROGRESS NOTES
depression. · Endocrine: No temperature intolerance. No excessive thirst, fluid intake, or urination. No tremor. · Hematologic/Lymphatic: No abnormal bruising or bleeding, blood clots or swollen lymph nodes. · Allergic/Immunologic: No nasal congestion or hives. Objective:   PHYSICAL EXAM:    Vitals:    03/02/20 0853   BP: 124/80   SpO2: 96%    Weight: 181 lb (82.1 kg)(shoes on)       General Appearance:  Alert, cooperative, no distress, appears stated age. Head:  Normocephalic, without obvious abnormality, atraumatic. Eyes:  Pupils equal and round. No scleral icterus. Mouth: Moist mucosa, no pharyngeal erythema. Nose: Nares normal. No drainage or sinus tenderness. Neck: Supple, symmetrical, trachea midline. No adenopathy. No tenderness/mass/nodules. No carotid bruit or elevated JVD. Lungs:   Clear to auscultation bilaterally, respirations unlabored. No wheeze, rales, or rhonchi. Chest Wall:  No tenderness or deformity. Heart:  Regular rate. S1/S2 normal. No murmur, rub, or gallop. Abdomen:   Soft, non-tender, bowel sounds active. Musculoskeletal: No muscle wasting or digital clubbing. Extremities: Extremities normal, atraumatic. No cyanosis or edema. Pulses: 2+ radial and carotid pulses, symmetric. Skin: No rashes or lesions. Pysch: Normal mood and affect. Alert and oriented x 4.    Neurologic: Normal gross motor and sensory exam.       Labs     CBC:   Lab Results   Component Value Date    WBC 6.5 10/06/2015    RBC 4.27 10/06/2015    HGB 14.0 10/06/2015    HCT 40.2 10/06/2015    MCV 94.2 10/06/2015    RDW 12.7 10/06/2015     10/06/2015     CMP:  Lab Results   Component Value Date     10/06/2015    K 4.1 10/06/2015    CL 97 10/06/2015    CO2 27 10/06/2015    BUN 13 10/06/2015    CREATININE 0.7 10/06/2015    GFRAA >60 10/06/2015    GFRAA >60 06/11/2012    AGRATIO 1.8 10/06/2015    LABGLOM >60 10/06/2015    GLUCOSE 82 04/30/2019    PROT 6.8 01/28/2019    PROT 6.8 06/11/2012

## 2020-04-16 ENCOUNTER — VIRTUAL VISIT (OUTPATIENT)
Dept: FAMILY MEDICINE CLINIC | Age: 60
End: 2020-04-16
Payer: COMMERCIAL

## 2020-04-16 ENCOUNTER — TELEPHONE (OUTPATIENT)
Dept: FAMILY MEDICINE CLINIC | Age: 60
End: 2020-04-16

## 2020-04-16 PROCEDURE — 99396 PREV VISIT EST AGE 40-64: CPT | Performed by: FAMILY MEDICINE

## 2020-04-16 RX ORDER — ESZOPICLONE 3 MG/1
3 TABLET, FILM COATED ORAL NIGHTLY
Qty: 30 TABLET | Refills: 1 | Status: SHIPPED | OUTPATIENT
Start: 2020-04-16 | End: 2020-06-15

## 2020-04-16 ASSESSMENT — ENCOUNTER SYMPTOMS
ABDOMINAL DISTENTION: 0
NAUSEA: 0
VOMITING: 0
CHEST TIGHTNESS: 0
ABDOMINAL PAIN: 0
ROS SKIN COMMENTS: NO SKIN CHANGE
SHORTNESS OF BREATH: 0
DIARRHEA: 0
TROUBLE SWALLOWING: 0
BACK PAIN: 0
SORE THROAT: 0
EYE PAIN: 0
BLOOD IN STOOL: 0
CONSTIPATION: 0
COUGH: 0

## 2020-04-16 NOTE — PROGRESS NOTES
Subjective:      Patient ID: Coleen Henderson is a 62 y.o. female. Patient presents with: Annual Exam    Here for the above     Doing well no c/o  All are healthy. Her mother is well     She is on the same medications. She has no problems with them   She continues to see dr Divine Yeh for mammogram and gynecology  She is on voltaren and is doing well for her  She had nml colon done in 2017  She has not smoked in 30 years  Her weight is at 182# by her scale    She did not get the stress test due to COVID problems   She has not had further symptoms   She did see cardiology    YOB: 1960    Date of Visit:  4/16/2020    No Known Allergies    Current Outpatient Medications:  hydrOXYzine (VISTARIL) 25 MG capsule, TAKE ONE CAPSULE BY MOUTH ONCE NIGHTLY AS NEEDED FOR SLEEP, Disp: 30 capsule, Rfl: 1  diclofenac (VOLTAREN) 75 MG EC tablet, TAKE 1 TABLET BY MOUTH 2 TIMES A DAY WITH MEALS IF DOING WELL YOU MAY TAKE 1 TABLET ONCE A DAY, Disp: 180 tablet, Rfl: 1  MAGNESIUM PO, Take by mouth, Disp: , Rfl:   Potassium (POTASSIMIN PO), Take by mouth, Disp: , Rfl:   TURMERIC PO, Take by mouth, Disp: , Rfl:   Cholecalciferol (VITAMIN D3) 2000 UNITS CAPS, Take 1,000 Units by mouth 2 times daily, Disp: , Rfl:   zinc gluconate 50 MG tablet, Take 50 mg by mouth daily, Disp: , Rfl:   Lysine 500 MG TABS, Take 500 mg by mouth 2 times daily, Disp: , Rfl:   Multiple Vitamins-Minerals (HAIR SKIN AND NAILS FORMULA PO), Take 1 tablet by mouth 3 times daily, Disp: , Rfl:     No current facility-administered medications for this visit. There were no vitals filed for this visit. There is no height or weight on file to calculate BMI.      Wt Readings from Last 3 Encounters:  03/02/20 : 181 lb (82.1 kg)  02/10/20 : 184 lb (83.5 kg)  02/19/19 : 166 lb (75.3 kg)    BP Readings from Last 3 Encounters:  03/02/20 : 124/80  02/10/20 : 122/84  02/19/19 : 130/77            Review of Systems   Constitutional: Negative for appetite change,

## 2020-05-05 ENCOUNTER — HOSPITAL ENCOUNTER (OUTPATIENT)
Age: 60
Discharge: HOME | End: 2020-05-05
Payer: COMMERCIAL

## 2020-05-05 ENCOUNTER — HOSPITAL ENCOUNTER (OUTPATIENT)
Dept: GENERAL RADIOLOGY | Age: 60
Discharge: HOME | End: 2020-05-05
Payer: COMMERCIAL

## 2020-05-05 ENCOUNTER — TELEPHONE (OUTPATIENT)
Dept: FAMILY MEDICINE CLINIC | Age: 60
End: 2020-05-05

## 2020-05-05 PROCEDURE — 72040 X-RAY EXAM NECK SPINE 2-3 VW: CPT

## 2020-05-12 ENCOUNTER — TELEPHONE (OUTPATIENT)
Dept: FAMILY MEDICINE CLINIC | Age: 60
End: 2020-05-12

## 2020-05-12 RX ORDER — METHYLPREDNISOLONE 4 MG/1
TABLET ORAL
Qty: 1 KIT | Refills: 0 | Status: SHIPPED | OUTPATIENT
Start: 2020-05-12 | End: 2020-05-18

## 2020-06-17 RX ORDER — ESZOPICLONE 3 MG/1
TABLET, FILM COATED ORAL
Qty: 30 TABLET | Refills: 0 | Status: SHIPPED | OUTPATIENT
Start: 2020-06-17 | End: 2020-07-17

## 2020-07-23 RX ORDER — ESZOPICLONE 3 MG/1
TABLET, FILM COATED ORAL
Qty: 30 TABLET | Refills: 1 | Status: SHIPPED | OUTPATIENT
Start: 2020-07-23 | End: 2020-09-21

## 2020-07-31 RX ORDER — HYDROXYZINE PAMOATE 25 MG/1
25 CAPSULE ORAL NIGHTLY PRN
Qty: 30 CAPSULE | Refills: 1 | Status: SHIPPED | OUTPATIENT
Start: 2020-07-31 | End: 2020-12-14 | Stop reason: SDUPTHER

## 2020-08-26 ENCOUNTER — EMPLOYEE WELLNESS (OUTPATIENT)
Dept: OTHER | Age: 60
End: 2020-08-26

## 2020-08-26 LAB
CHOLESTEROL, TOTAL: 194 MG/DL (ref 0–199)
GLUCOSE BLD-MCNC: 89 MG/DL (ref 70–99)
HDLC SERPL-MCNC: 48 MG/DL (ref 40–60)
LDL CHOLESTEROL CALCULATED: 125 MG/DL
TRIGL SERPL-MCNC: 104 MG/DL (ref 0–150)

## 2020-09-24 RX ORDER — DICLOFENAC SODIUM 75 MG/1
TABLET, DELAYED RELEASE ORAL
Qty: 180 TABLET | Refills: 1 | Status: SHIPPED | OUTPATIENT
Start: 2020-09-24 | End: 2021-04-20

## 2020-10-19 VITALS — BODY MASS INDEX: 28.73 KG/M2 | WEIGHT: 178 LBS

## 2020-12-14 RX ORDER — HYDROXYZINE PAMOATE 25 MG/1
25 CAPSULE ORAL NIGHTLY PRN
Qty: 30 CAPSULE | Refills: 1 | Status: SHIPPED | OUTPATIENT
Start: 2020-12-14 | End: 2021-03-23

## 2021-01-31 DIAGNOSIS — G47.00 INSOMNIA, UNSPECIFIED TYPE: Primary | ICD-10-CM

## 2021-02-02 RX ORDER — ESZOPICLONE 3 MG/1
3 TABLET, FILM COATED ORAL NIGHTLY PRN
Qty: 30 TABLET | Refills: 0 | Status: SHIPPED | OUTPATIENT
Start: 2021-02-02 | End: 2021-03-04

## 2021-03-20 DIAGNOSIS — G47.00 INSOMNIA, UNSPECIFIED TYPE: Primary | ICD-10-CM

## 2021-03-22 DIAGNOSIS — G47.00 INSOMNIA, UNSPECIFIED TYPE: ICD-10-CM

## 2021-03-22 NOTE — TELEPHONE ENCOUNTER
Which medicine did she want refil for sleep   I got two of them for request   gurinder      [phone] - Left [facility] to return call.

## 2021-03-23 RX ORDER — ESZOPICLONE 3 MG/1
TABLET, FILM COATED ORAL
Qty: 30 TABLET | Refills: 1 | Status: SHIPPED | OUTPATIENT
Start: 2021-03-23 | End: 2021-05-22

## 2021-03-23 RX ORDER — HYDROXYZINE PAMOATE 25 MG/1
CAPSULE ORAL
Qty: 30 CAPSULE | Refills: 0 | Status: SHIPPED | OUTPATIENT
Start: 2021-03-23 | End: 2021-05-07

## 2021-04-20 RX ORDER — DICLOFENAC SODIUM 75 MG/1
TABLET, DELAYED RELEASE ORAL
Qty: 180 TABLET | Refills: 1 | Status: SHIPPED
Start: 2021-04-20 | End: 2021-05-14 | Stop reason: ALTCHOICE

## 2021-04-26 ENCOUNTER — OFFICE VISIT (OUTPATIENT)
Dept: CARDIOLOGY CLINIC | Age: 61
End: 2021-04-26
Payer: COMMERCIAL

## 2021-04-26 VITALS
HEIGHT: 67 IN | WEIGHT: 184.4 LBS | OXYGEN SATURATION: 97 % | HEART RATE: 62 BPM | DIASTOLIC BLOOD PRESSURE: 76 MMHG | SYSTOLIC BLOOD PRESSURE: 132 MMHG | BODY MASS INDEX: 28.94 KG/M2

## 2021-04-26 DIAGNOSIS — R06.09 DOE (DYSPNEA ON EXERTION): ICD-10-CM

## 2021-04-26 DIAGNOSIS — R07.89 ATYPICAL CHEST PAIN: Primary | ICD-10-CM

## 2021-04-26 PROCEDURE — 99214 OFFICE O/P EST MOD 30 MIN: CPT | Performed by: INTERNAL MEDICINE

## 2021-04-26 PROCEDURE — 93000 ELECTROCARDIOGRAM COMPLETE: CPT | Performed by: INTERNAL MEDICINE

## 2021-04-26 NOTE — PATIENT INSTRUCTIONS
Patient Education        High Cholesterol: Care Instructions  Your Care Instructions     Cholesterol is a type of fat in your blood. It is needed for many body functions, such as making new cells. Cholesterol is made by your body. It also comes from food you eat. High cholesterol means that you have too much of the fat in your blood. This raises your risk of a heart attack and stroke. LDL and HDL are part of your total cholesterol. LDL is the \"bad\" cholesterol. High LDL can raise your risk for heart disease, heart attack, and stroke. HDL is the \"good\" cholesterol. It helps clear bad cholesterol from the body. High HDL is linked with a lower risk of heart disease, heart attack, and stroke. Your cholesterol levels help your doctor find out your risk for having a heart attack or stroke. You and your doctor can talk about whether you need to lower your risk and what treatment is best for you. A heart-healthy lifestyle along with medicines can help lower your cholesterol and your risk. The way you choose to lower your risk will depend on how high your risk is for heart attack and stroke. It will also depend on how you feel about taking medicines. Follow-up care is a key part of your treatment and safety. Be sure to make and go to all appointments, and call your doctor if you are having problems. It's also a good idea to know your test results and keep a list of the medicines you take. How can you care for yourself at home? · Eat a variety of foods every day. Good choices include fruits, vegetables, whole grains (like oatmeal), dried beans and peas, nuts and seeds, soy products (like tofu), and fat-free or low-fat dairy products. · Replace butter, margarine, and hydrogenated or partially hydrogenated oils with olive and canola oils. (Canola oil margarine without trans fat is fine.)  · Replace red meat with fish, poultry, and soy protein (like tofu).   · Limit processed and packaged foods like chips, crackers, and cookies. · Bake, broil, or steam foods. Don't russell them. · Be physically active. Get at least 30 minutes of exercise on most days of the week. Walking is a good choice. You also may want to do other activities, such as running, swimming, cycling, or playing tennis or team sports. · Stay at a healthy weight or lose weight by making the changes in eating and physical activity listed above. Losing just a small amount of weight, even 5 to 10 pounds, can reduce your risk for having a heart attack or stroke. · Do not smoke. When should you call for help? Watch closely for changes in your health, and be sure to contact your doctor if:    · You need help making lifestyle changes.     · You have questions about your medicine. Where can you learn more? Go to [email or web address].[email or web address]. org and sign in to your [facility] account. Enter W670 in the SmartStudy.com box to learn more about \"High Cholesterol: Care Instructions. \"     If you do not have an account, please click on the \"Sign Up Now\" link. Current as of: August 31, 2020               Content Version: 12.8  © 2060-3929 Healthwise, Incorporated. Care instructions adapted under license by [facility] ([facility]). If you have questions about a medical condition or this instruction, always ask your healthcare professional. [address] any warranty or liability for your use of this information.

## 2021-04-26 NOTE — PROGRESS NOTES
Cardiology Consultation   Referring Physician: Selena Mtz MD  Reason for Consultation: Chest Pain  Chief Complaint: \"Just a check up. \"     Subjective:   History of Present Illness:  Aura Montes De Oca is a 62 y.o. female who presents with the above complaint. She has a past medical history for insomnia. Today, she states overall she is doing well. She denies any new cardiac complaints. She reports intermittent \"sharp\" fleeting chest pains that remain unchanged. A stress test and echo was ordered but she states she did not feel it was necessary to complete. She denies any exertional chest pains or worsening shortness of breath. She reports compliance with her medications and tolerating. She denies any abnormal bleeding or bruising. Patient denies exertional chest pain/pressure, dyspnea at rest, AN, PND, orthopnea, palpitations, lightheadedness, weight changes, changes in LE edema, and syncope. Prior cardiac testing:    EK21 Sinus bradycardia    Past Medical History:   has a past medical history of Chest pain, Hepatitis, Right tennis elbow, and Trochanteric bursitis of right hip. Surgical History:   has a past surgical history that includes Tubal ligation; [place] tooth extraction; and Colonoscopy (2017). Social History:   reports that she quit smoking about 30 years ago. She started smoking about 44 years ago. She has a 7.00 pack-year smoking history. She has never used smokeless tobacco. She reports that she does not drink alcohol or use drugs. Family History:  family history includes Arthritis in an other family member; Asthma in an other family member; Cancer in her brother, father, maternal grandfather, and mother; Diabetes in her brother; Heart Disease in her mother; No Known Problems in her maternal aunt, maternal grandmother, maternal uncle, paternal aunt, paternal grandfather, paternal grandmother, paternal uncle, and sister.     Home Medications:  Were reviewed and are listed in nursing record and/or below  Prior to Admission medications    Medication Sig Start Date End Date Taking? Authorizing Provider   diclofenac (VOLTAREN) 75 MG EC tablet TAKE 1 TABLET BY MOUTH 2 TIMES A DAY WITH MEALS ( IF DOING WELL YOU MAY TAKE 1 TABLET BY MOUTH ONE TIME DAILY ) 4/20/21  Yes Severo Gonzalez MD   hydrOXYzine (VISTARIL) 25 MG capsule TAKE ONE CAPSULE BY MOUTH ONCE NIGHTLY AS NEEDED FOR SLEEP 3/23/21  Yes Severo Gonzalez MD   eszopiclone (LUNESTA) 3 MG TABS TAKE ONE TABLET BY MOUTH ONCE NIGHTLY AS NEEDED FOR SLEEP FOR UP TO 30 DAYS 3/23/21 5/22/21 Yes Severo Gonzalez MD   MAGNESIUM PO Take by mouth   Yes Historical Provider, MD   Potassium (POTASSIMIN PO) Take by mouth   Yes Historical Provider, MD   TURMERIC PO Take by mouth   Yes Historical Provider, MD   Cholecalciferol (VITAMIN D3) 2000 UNITS CAPS Take 1,000 Units by mouth 2 times daily   Yes Historical Provider, MD   zinc gluconate 50 MG tablet Take 50 mg by mouth daily   Yes Historical Provider, MD   Lysine 500 MG TABS Take 500 mg by mouth 2 times daily   Yes Historical Provider, MD   Multiple Vitamins-Minerals (HAIR SKIN AND NAILS FORMULA PO) Take 1 tablet by mouth 3 times daily   Yes Historical Provider, MD      Allergies:  Patient has no known allergies. Review of Systems:   · Constitutional: no unanticipated weight loss. There's been no change in energy level, sleep pattern, or activity level. No fevers, chills. · Eyes: No visual changes or diplopia. No scleral icterus. · ENT: No Headaches, hearing loss or vertigo. No mouth sores or sore throat. · Cardiovascular: as reviewed in HPI  · Respiratory: No cough or wheezing, no sputum production. No hemoptysis. · Gastrointestinal: No abdominal pain, appetite loss, blood in stools. No change in bowel or bladder habits. · Genitourinary: No dysuria, trouble voiding, or hematuria. · Musculoskeletal:  No gait disturbance, no joint complaints.   · Integumentary: No rash or pruritis. · Neurological: No headache, diplopia, change in muscle strength, numbness or tingling. · Psychiatric: No anxiety or depression. · Endocrine: No temperature intolerance. No excessive thirst, fluid intake, or urination. No tremor. · Hematologic/Lymphatic: No abnormal bruising or bleeding, blood clots or swollen lymph nodes. · Allergic/Immunologic: No nasal congestion or hives. Objective:   PHYSICAL EXAM:    Vitals:    04/26/21 0924   BP: 132/76   Pulse: 62   SpO2: 97%    Weight: 184 lb 6.4 oz (83.6 kg)       General Appearance:  Alert, cooperative, no distress, appears stated age. Head:  Normocephalic, without obvious abnormality, atraumatic. Eyes:  Pupils equal and round. No scleral icterus. Mouth: Moist mucosa, no pharyngeal erythema. Nose: Nares normal. No drainage or sinus tenderness. Neck: Supple, symmetrical, trachea midline. No adenopathy. No tenderness/mass/nodules. No carotid bruit or elevated JVD. Lungs:   Clear to auscultation bilaterally, respirations unlabored. No wheeze, rales, or rhonchi. Chest Wall:  No tenderness or deformity. Heart:  Regular rate. S1/S2 normal. No murmur, rub, or gallop. Abdomen:   Soft, non-tender, bowel sounds active. Musculoskeletal: No muscle wasting or digital clubbing. Extremities: Extremities normal, atraumatic. No cyanosis or edema. Pulses: 2+ radial and carotid pulses, symmetric. Skin: No rashes or lesions. Pysch: Normal mood and affect. Alert and oriented x 4.    Neurologic: Normal gross motor and sensory exam.       Labs     CBC:   Lab Results   Component Value Date    WBC 6.5 10/06/2015    RBC 4.27 10/06/2015    HGB 14.0 10/06/2015    HCT 40.2 10/06/2015    MCV 94.2 10/06/2015    RDW 12.7 10/06/2015     10/06/2015     CMP:  Lab Results   Component Value Date     10/06/2015    K 4.1 10/06/2015    CL 97 10/06/2015    CO2 27 10/06/2015    BUN 13 10/06/2015    CREATININE 0.7 10/06/2015 GFRAA >60 10/06/2015    GFRAA >60 06/11/2012    AGRATIO 1.8 10/06/2015    LABGLOM >60 10/06/2015    GLUCOSE 89 08/26/2020    PROT 6.8 01/28/2019    PROT 6.8 06/11/2012    CALCIUM 8.9 10/06/2015    BILITOT 0.4 01/28/2019    ALKPHOS 84 01/28/2019    AST 19 01/28/2019    ALT 27 01/28/2019     PT/INR:  No results found for: PTINR  HgBA1c:No results found for: LABA1C  No results found for: CKTOTAL, CKMB, CKMBINDEX, TROPONINI    CURRENT Medications:  Current Outpatient Medications on File Prior to Visit   Medication Sig Dispense Refill    diclofenac (VOLTAREN) 75 MG EC tablet TAKE 1 TABLET BY MOUTH 2 TIMES A DAY WITH MEALS ( IF DOING WELL YOU MAY TAKE 1 TABLET BY MOUTH ONE TIME DAILY ) 180 tablet 1    hydrOXYzine (VISTARIL) 25 MG capsule TAKE ONE CAPSULE BY MOUTH ONCE NIGHTLY AS NEEDED FOR SLEEP 30 capsule 0    eszopiclone (LUNESTA) 3 MG TABS TAKE ONE TABLET BY MOUTH ONCE NIGHTLY AS NEEDED FOR SLEEP FOR UP TO 30 DAYS 30 tablet 1    MAGNESIUM PO Take by mouth      Potassium (POTASSIMIN PO) Take by mouth      TURMERIC PO Take by mouth      Cholecalciferol (VITAMIN D3) 2000 UNITS CAPS Take 1,000 Units by mouth 2 times daily      zinc gluconate 50 MG tablet Take 50 mg by mouth daily      Lysine 500 MG TABS Take 500 mg by mouth 2 times daily      Multiple Vitamins-Minerals (HAIR SKIN AND NAILS FORMULA PO) Take 1 tablet by mouth 3 times daily       No current facility-administered medications on file prior to visit. Assessment and Plan   1) Atypical chest Pain  Essentially resolved but reports atypical chest pains   GXT stress test ordered but not completed   Will reorder plain GXT    2) Dyspnea  Resolved   With exertion  Echocardiogram ordered but not completed     Follow up in 1 year    Thank you for allowing us to participate in the care of Krista Orellana.     Jose Laws MD [id number]   [address]   (J): [phone]  Nino Kocher): [phone]     This note was scribed in

## 2021-05-05 ENCOUNTER — HOSPITAL ENCOUNTER (OUTPATIENT)
Dept: NON INVASIVE DIAGNOSTICS | Age: 61
Discharge: HOME | End: 2021-05-05
Payer: COMMERCIAL

## 2021-05-05 DIAGNOSIS — R07.89 ATYPICAL CHEST PAIN: ICD-10-CM

## 2021-05-05 PROCEDURE — 93017 CV STRESS TEST TRACING ONLY: CPT

## 2021-05-07 ENCOUNTER — TELEPHONE (OUTPATIENT)
Dept: FAMILY MEDICINE CLINIC | Age: 61
End: 2021-05-07

## 2021-05-07 RX ORDER — HYDROXYZINE PAMOATE 25 MG/1
CAPSULE ORAL
Qty: 30 CAPSULE | Refills: 0 | Status: SHIPPED | OUTPATIENT
Start: 2021-05-07 | End: 2021-06-04 | Stop reason: SDUPTHER

## 2021-05-07 RX ORDER — CYCLOBENZAPRINE HCL 10 MG
10 TABLET ORAL 3 TIMES DAILY PRN
Qty: 21 TABLET | Refills: 0 | Status: SHIPPED | OUTPATIENT
Start: 2021-05-07 | End: 2021-06-04

## 2021-05-07 NOTE — TELEPHONE ENCOUNTER
Done  Orders Placed This Encounter   Medications    cyclobenzaprine (FLEXERIL) 10 MG tablet     Sig: Take 1 tablet by mouth 3 times daily as needed for Muscle spasms     Dispense:  21 tablet     Refill:  0

## 2021-05-07 NOTE — TELEPHONE ENCOUNTER
From   [employer] \"Georgie\" To   [facility] Staff Sent   5/7/2021  2:52 PM   Dr. Syed Painting, I have been having terrible muscle cramps, like Dmitry horses, all over but mostly in my legs and feet and mostly at night. Is there anything I can do or take to prevent them? They are excruciating when they happen in my thighs or calves and then my muscle will be sore for days after. Didn't know if you could advise me but I thought I would give it a go.  Thanks, Tejas Miller

## 2021-05-14 ENCOUNTER — OFFICE VISIT (OUTPATIENT)
Dept: FAMILY MEDICINE CLINIC | Age: 61
End: 2021-05-14
Payer: COMMERCIAL

## 2021-05-14 VITALS
SYSTOLIC BLOOD PRESSURE: 128 MMHG | WEIGHT: 184 LBS | BODY MASS INDEX: 28.88 KG/M2 | TEMPERATURE: 97.9 F | DIASTOLIC BLOOD PRESSURE: 84 MMHG | HEIGHT: 67 IN

## 2021-05-14 DIAGNOSIS — M25.50 ARTHRALGIA, UNSPECIFIED JOINT: ICD-10-CM

## 2021-05-14 DIAGNOSIS — Z23 NEED FOR 23-POLYVALENT PNEUMOCOCCAL POLYSACCHARIDE VACCINE: ICD-10-CM

## 2021-05-14 DIAGNOSIS — M79.10 MYALGIA: Primary | ICD-10-CM

## 2021-05-14 PROCEDURE — 99213 OFFICE O/P EST LOW 20 MIN: CPT | Performed by: FAMILY MEDICINE

## 2021-05-14 PROCEDURE — 90732 PPSV23 VACC 2 YRS+ SUBQ/IM: CPT | Performed by: FAMILY MEDICINE

## 2021-05-14 PROCEDURE — 90471 IMMUNIZATION ADMIN: CPT | Performed by: FAMILY MEDICINE

## 2021-05-14 RX ORDER — NAPROXEN 500 MG/1
500 TABLET ORAL 2 TIMES DAILY WITH MEALS
Qty: 180 TABLET | Refills: 0 | Status: SHIPPED | OUTPATIENT
Start: 2021-05-14 | End: 2022-05-14

## 2021-05-14 SDOH — ECONOMIC STABILITY: FOOD INSECURITY: WITHIN THE PAST 12 MONTHS, YOU WORRIED THAT YOUR FOOD WOULD RUN OUT BEFORE YOU GOT MONEY TO BUY MORE.: NEVER TRUE

## 2021-05-14 ASSESSMENT — PATIENT HEALTH QUESTIONNAIRE - PHQ9
1. LITTLE INTEREST OR PLEASURE IN DOING THINGS: 0
SUM OF ALL RESPONSES TO PHQ9 QUESTIONS 1 & 2: 0
2. FEELING DOWN, DEPRESSED OR HOPELESS: 0
SUM OF ALL RESPONSES TO PHQ QUESTIONS 1-9: 0
SUM OF ALL RESPONSES TO PHQ QUESTIONS 1-9: 0

## 2021-05-14 NOTE — PATIENT INSTRUCTIONS
Do take fluids  chnage the voltaren to naprosyn  Le me know how the treatment is doing   See in 9 months

## 2021-05-14 NOTE — PROGRESS NOTES
Subjective:      Patient ID: Chanda Soria is a 62 y.o. female. Chief Complaint   Patient presents with    Follow-up     muscle cramps        Patient presents with: Follow-up: muscle cramps    Onset of muscle cramps in extremities  Muscle relaxer helped   She uses at night and it is helping and is helping the sleep as well     1-2 years and comes and goes   No swelling urine is passing well  bm ok  No abdomen pain  No hx of thyroid pb    Will use aleve. As well  She has am stiff and will last 10 min  No joint swelling  No family hx of joint pb   One of kid is seeing rheumatologist   No rash no fever  No sweats at night  No weight loss   Joint pain in feet and her hip  And at times knees    YOB: 1960    Date of Visit:  5/14/2021    No Known Allergies    Current Outpatient Medications:  hydrOXYzine (VISTARIL) 25 MG capsule, TAKE ONE CAPSULE BY MOUTH ONCE NIGHTLY AS NEEDED FOR SLEEP, Disp: 30 capsule, Rfl: 0  cyclobenzaprine (FLEXERIL) 10 MG tablet, Take 1 tablet by mouth 3 times daily as needed for Muscle spasms, Disp: 21 tablet, Rfl: 0  diclofenac (VOLTAREN) 75 MG EC tablet, TAKE 1 TABLET BY MOUTH 2 TIMES A DAY WITH MEALS ( IF DOING WELL YOU MAY TAKE 1 TABLET BY MOUTH ONE TIME DAILY ), Disp: 180 tablet, Rfl: 1  eszopiclone (LUNESTA) 3 MG TABS, TAKE ONE TABLET BY MOUTH ONCE NIGHTLY AS NEEDED FOR SLEEP FOR UP TO 30 DAYS, Disp: 30 tablet, Rfl: 1  MAGNESIUM PO, Take by mouth, Disp: , Rfl:   Potassium (POTASSIMIN PO), Take by mouth, Disp: , Rfl:   TURMERIC PO, Take by mouth, Disp: , Rfl:   Cholecalciferol (VITAMIN D3) 2000 UNITS CAPS, Take 1,000 Units by mouth 2 times daily, Disp: , Rfl:   zinc gluconate 50 MG tablet, Take 50 mg by mouth daily, Disp: , Rfl:   Lysine 500 MG TABS, Take 500 mg by mouth 2 times daily, Disp: , Rfl:   Multiple Vitamins-Minerals (HAIR SKIN AND NAILS FORMULA PO), Take 1 tablet by mouth 3 times daily, Disp: , Rfl:     No current facility-administered medications for this visit. ---------------------------               05/14/21                      1446         ---------------------------   BP:          128/84         Site:    Left Upper Arm     Position:     Sitting        Cuff Size:  Medium Adult      Temp:   97.9 °F (36.6 °C)   TempSrc:    Temporal        Weight: 184 lb (83.5 kg)    Height:  5' 7\" (1.702 m)   ---------------------------  Body mass index is 28.82 kg/m². Wt Readings from Last 3 Encounters:  05/14/21 : 184 lb (83.5 kg)  04/26/21 : 184 lb 6.4 oz (83.6 kg)  08/26/20 : 178 lb (80.7 kg)    BP Readings from Last 3 Encounters:  05/14/21 : 128/84  04/26/21 : 132/76  03/02/20 : 124/80              Review of Systems    Objective:   Physical Exam  Constitutional:       General: She is not in acute distress. Appearance: Normal appearance. She is not ill-appearing. Cardiovascular:      Comments: No edema   Musculoskeletal:      Comments: No synovitis in her hands no swelling       Neurological:      Mental Status: She is alert. Assessment:       Diagnosis Orders   1. Myalgia  Comprehensive Metabolic Panel    CBC Auto Differential    TSH without Reflex    Sedimentation Rate   2. Arthralgia, unspecified joint  Comprehensive Metabolic Panel    CBC Auto Differential    Sedimentation Rate   3.  Need for 23-polyvalent pneumococcal polysaccharide vaccine  PNEUMOVAX 23 subcutaneous/IM (Pneumococcal polysaccharide vaccine 23-valent >= 3yo)       Orders Placed This Encounter   Medications    naproxen (EC-NAPROSYN) 500 MG EC tablet     Sig: Take 1 tablet by mouth 2 times daily (with meals)     Dispense:  180 tablet     Refill:  0     Discussed getting shingle and covid       Plan:      Do take fluids  chnage the voltaren to naprosyn  Le me know how the treatment is doing   See in 9 months         Brianna Sethi MD

## 2021-06-01 ENCOUNTER — TELEPHONE (OUTPATIENT)
Dept: FAMILY MEDICINE CLINIC | Age: 61
End: 2021-06-01

## 2021-06-01 NOTE — TELEPHONE ENCOUNTER
I can but I do need the blood work done to look for other causes  And for the muscle pain she might consider seeing rheumatologist

## 2021-06-04 RX ORDER — HYDROXYZINE PAMOATE 25 MG/1
25 CAPSULE ORAL NIGHTLY PRN
Qty: 90 CAPSULE | Refills: 0 | Status: SHIPPED | OUTPATIENT
Start: 2021-06-04

## 2021-06-04 RX ORDER — CYCLOBENZAPRINE HCL 10 MG
10 TABLET ORAL NIGHTLY PRN
Qty: 90 TABLET | Refills: 0 | Status: SHIPPED | OUTPATIENT
Start: 2021-06-04

## 2021-09-03 ENCOUNTER — IMMUNIZATION (OUTPATIENT)
Dept: VACCINE CLINIC | Facility: HOSPITAL | Age: 61
End: 2021-09-03

## 2021-09-03 PROCEDURE — 91300 HC SARSCOV02 VAC 30MCG/0.3ML IM: CPT | Performed by: INTERNAL MEDICINE

## 2021-09-03 PROCEDURE — 0001A: CPT | Performed by: INTERNAL MEDICINE

## 2021-09-24 ENCOUNTER — IMMUNIZATION (OUTPATIENT)
Dept: VACCINE CLINIC | Facility: HOSPITAL | Age: 61
End: 2021-09-24

## 2021-09-24 PROCEDURE — 0002A: CPT | Performed by: INTERNAL MEDICINE

## 2021-09-24 PROCEDURE — 91300 HC SARSCOV02 VAC 30MCG/0.3ML IM: CPT | Performed by: INTERNAL MEDICINE

## 2021-12-16 ENCOUNTER — TRANSCRIBE ORDERS (OUTPATIENT)
Dept: ADMINISTRATIVE | Facility: HOSPITAL | Age: 61
End: 2021-12-16

## 2021-12-16 ENCOUNTER — HOSPITAL ENCOUNTER (OUTPATIENT)
Dept: GENERAL RADIOLOGY | Facility: HOSPITAL | Age: 61
Discharge: HOME | End: 2021-12-16
Admitting: NURSE PRACTITIONER

## 2021-12-16 DIAGNOSIS — M54.50 ACUTE LOW BACK PAIN WITHOUT SCIATICA, UNSPECIFIED BACK PAIN LATERALITY: Primary | ICD-10-CM

## 2021-12-16 DIAGNOSIS — M54.50 ACUTE LOW BACK PAIN WITHOUT SCIATICA, UNSPECIFIED BACK PAIN LATERALITY: ICD-10-CM

## 2021-12-16 PROCEDURE — 72100 X-RAY EXAM L-S SPINE 2/3 VWS: CPT | Performed by: RADIOLOGY

## 2021-12-16 PROCEDURE — 72100 X-RAY EXAM L-S SPINE 2/3 VWS: CPT

## 2022-02-17 ENCOUNTER — APPOINTMENT (OUTPATIENT)
Dept: BONE DENSITY | Facility: HOSPITAL | Age: 62
End: 2022-02-17

## 2022-02-17 ENCOUNTER — APPOINTMENT (OUTPATIENT)
Dept: MAMMOGRAPHY | Facility: HOSPITAL | Age: 62
End: 2022-02-17

## 2022-09-08 ENCOUNTER — TRANSCRIBE ORDERS (OUTPATIENT)
Dept: ADMINISTRATIVE | Facility: HOSPITAL | Age: 62
End: 2022-09-08

## 2022-09-08 ENCOUNTER — HOSPITAL ENCOUNTER (OUTPATIENT)
Dept: GENERAL RADIOLOGY | Facility: HOSPITAL | Age: 62
Discharge: HOME | End: 2022-09-08
Admitting: INTERNAL MEDICINE

## 2022-09-08 DIAGNOSIS — K80.20 GALL BLADDER STONES: Primary | ICD-10-CM

## 2022-09-08 DIAGNOSIS — M50.90 CERVICAL DISC DISORDER: Primary | ICD-10-CM

## 2022-09-08 DIAGNOSIS — M50.90 CERVICAL DISC DISORDER: ICD-10-CM

## 2022-09-08 DIAGNOSIS — M51.16 LUMBAR DISC DISEASE WITH RADICULOPATHY: ICD-10-CM

## 2022-09-08 PROCEDURE — 72052 X-RAY EXAM NECK SPINE 6/>VWS: CPT

## 2022-09-08 PROCEDURE — 72052 X-RAY EXAM NECK SPINE 6/>VWS: CPT | Performed by: RADIOLOGY

## 2022-09-08 PROCEDURE — 72110 X-RAY EXAM L-2 SPINE 4/>VWS: CPT | Performed by: RADIOLOGY

## 2022-09-08 PROCEDURE — 72110 X-RAY EXAM L-2 SPINE 4/>VWS: CPT

## 2022-09-12 ENCOUNTER — TRANSCRIBE ORDERS (OUTPATIENT)
Dept: ADMINISTRATIVE | Facility: HOSPITAL | Age: 62
End: 2022-09-12

## 2022-09-12 ENCOUNTER — HOSPITAL ENCOUNTER (OUTPATIENT)
Dept: ULTRASOUND IMAGING | Facility: HOSPITAL | Age: 62
Discharge: HOME | End: 2022-09-12

## 2022-09-12 ENCOUNTER — LAB (OUTPATIENT)
Dept: LAB | Facility: HOSPITAL | Age: 62
End: 2022-09-12

## 2022-09-12 DIAGNOSIS — Z12.31 VISIT FOR SCREENING MAMMOGRAM: ICD-10-CM

## 2022-09-12 DIAGNOSIS — K80.20 GALL BLADDER STONES: ICD-10-CM

## 2022-09-12 DIAGNOSIS — M25.50 PAIN IN JOINT, MULTIPLE SITES: ICD-10-CM

## 2022-09-12 DIAGNOSIS — F51.01 PRIMARY INSOMNIA: ICD-10-CM

## 2022-09-12 DIAGNOSIS — E55.9 VITAMIN D DEFICIENCY: ICD-10-CM

## 2022-09-12 DIAGNOSIS — E55.9 VITAMIN D DEFICIENCY: Primary | ICD-10-CM

## 2022-09-12 DIAGNOSIS — K21.9 GASTRIC REFLUX: ICD-10-CM

## 2022-09-12 DIAGNOSIS — K21.9 CHALASIA OF LOWER ESOPHAGEAL SPHINCTER: ICD-10-CM

## 2022-09-12 DIAGNOSIS — Z78.0 MENOPAUSE: ICD-10-CM

## 2022-09-12 LAB
25(OH)D3 SERPL-MCNC: 52.6 NG/ML (ref 30–100)
ALBUMIN SERPL-MCNC: 4.12 G/DL (ref 3.5–5.2)
ALBUMIN/GLOB SERPL: 1.9 G/DL
ALP SERPL-CCNC: 93 U/L (ref 39–117)
ALT SERPL W P-5'-P-CCNC: 30 U/L (ref 1–33)
AMYLASE SERPL-CCNC: 60 U/L (ref 28–100)
ANION GAP SERPL CALCULATED.3IONS-SCNC: 9.2 MMOL/L (ref 5–15)
AST SERPL-CCNC: 23 U/L (ref 1–32)
BASOPHILS # BLD AUTO: 0.07 10*3/MM3 (ref 0–0.2)
BASOPHILS NFR BLD AUTO: 1.2 % (ref 0–1.5)
BILIRUB SERPL-MCNC: 0.4 MG/DL (ref 0–1.2)
BUN SERPL-MCNC: 13 MG/DL (ref 8–23)
BUN/CREAT SERPL: 16.9 (ref 7–25)
CALCIUM SPEC-SCNC: 9 MG/DL (ref 8.6–10.5)
CHLORIDE SERPL-SCNC: 108 MMOL/L (ref 98–107)
CHOLEST SERPL-MCNC: 172 MG/DL (ref 0–200)
CK SERPL-CCNC: 103 U/L (ref 20–180)
CO2 SERPL-SCNC: 26.8 MMOL/L (ref 22–29)
CREAT SERPL-MCNC: 0.77 MG/DL (ref 0.57–1)
CRP SERPL-MCNC: 0.85 MG/DL (ref 0–0.5)
DEPRECATED RDW RBC AUTO: 41.6 FL (ref 37–54)
EGFRCR SERPLBLD CKD-EPI 2021: 87.9 ML/MIN/1.73
EOSINOPHIL # BLD AUTO: 0.56 10*3/MM3 (ref 0–0.4)
EOSINOPHIL NFR BLD AUTO: 9.7 % (ref 0.3–6.2)
ERYTHROCYTE [DISTWIDTH] IN BLOOD BY AUTOMATED COUNT: 12.1 % (ref 12.3–15.4)
ERYTHROCYTE [SEDIMENTATION RATE] IN BLOOD: 2 MM/HR (ref 0–30)
FOLATE SERPL-MCNC: 17.8 NG/ML (ref 4.78–24.2)
GLOBULIN UR ELPH-MCNC: 2.2 GM/DL
GLUCOSE SERPL-MCNC: 118 MG/DL (ref 65–99)
HCT VFR BLD AUTO: 38.2 % (ref 34–46.6)
HDLC SERPL-MCNC: 54 MG/DL (ref 40–60)
HGB BLD-MCNC: 12.6 G/DL (ref 12–15.9)
IMM GRANULOCYTES # BLD AUTO: 0.01 10*3/MM3 (ref 0–0.05)
IMM GRANULOCYTES NFR BLD AUTO: 0.2 % (ref 0–0.5)
LDLC SERPL CALC-MCNC: 105 MG/DL (ref 0–100)
LDLC/HDLC SERPL: 1.93 {RATIO}
LIPASE SERPL-CCNC: 40 U/L (ref 13–60)
LYMPHOCYTES # BLD AUTO: 1.23 10*3/MM3 (ref 0.7–3.1)
LYMPHOCYTES NFR BLD AUTO: 21.2 % (ref 19.6–45.3)
MCH RBC QN AUTO: 31 PG (ref 26.6–33)
MCHC RBC AUTO-ENTMCNC: 33 G/DL (ref 31.5–35.7)
MCV RBC AUTO: 93.9 FL (ref 79–97)
MONOCYTES # BLD AUTO: 0.46 10*3/MM3 (ref 0.1–0.9)
MONOCYTES NFR BLD AUTO: 7.9 % (ref 5–12)
NEUTROPHILS NFR BLD AUTO: 3.46 10*3/MM3 (ref 1.7–7)
NEUTROPHILS NFR BLD AUTO: 59.8 % (ref 42.7–76)
NRBC BLD AUTO-RTO: 0 /100 WBC (ref 0–0.2)
PLATELET # BLD AUTO: 268 10*3/MM3 (ref 140–450)
PMV BLD AUTO: 9.2 FL (ref 6–12)
POTASSIUM SERPL-SCNC: 4.3 MMOL/L (ref 3.5–5.2)
PROT SERPL-MCNC: 6.3 G/DL (ref 6–8.5)
RBC # BLD AUTO: 4.07 10*6/MM3 (ref 3.77–5.28)
SODIUM SERPL-SCNC: 144 MMOL/L (ref 136–145)
T3 SERPL-MCNC: 163 NG/DL (ref 80–200)
T4 SERPL-MCNC: 8.48 MCG/DL (ref 4.5–11.7)
TRIGL SERPL-MCNC: 68 MG/DL (ref 0–150)
TSH SERPL DL<=0.05 MIU/L-ACNC: 1.68 UIU/ML (ref 0.27–4.2)
VIT B12 BLD-MCNC: 683 PG/ML (ref 211–946)
VLDLC SERPL-MCNC: 13 MG/DL (ref 5–40)
WBC NRBC COR # BLD: 5.79 10*3/MM3 (ref 3.4–10.8)

## 2022-09-12 PROCEDURE — 84480 ASSAY TRIIODOTHYRONINE (T3): CPT

## 2022-09-12 PROCEDURE — 80061 LIPID PANEL: CPT

## 2022-09-12 PROCEDURE — 86038 ANTINUCLEAR ANTIBODIES: CPT

## 2022-09-12 PROCEDURE — 83690 ASSAY OF LIPASE: CPT

## 2022-09-12 PROCEDURE — 82150 ASSAY OF AMYLASE: CPT

## 2022-09-12 PROCEDURE — 86677 HELICOBACTER PYLORI ANTIBODY: CPT

## 2022-09-12 PROCEDURE — 76700 US EXAM ABDOM COMPLETE: CPT

## 2022-09-12 PROCEDURE — 82607 VITAMIN B-12: CPT

## 2022-09-12 PROCEDURE — 76700 US EXAM ABDOM COMPLETE: CPT | Performed by: RADIOLOGY

## 2022-09-12 PROCEDURE — 80050 GENERAL HEALTH PANEL: CPT

## 2022-09-12 PROCEDURE — 82306 VITAMIN D 25 HYDROXY: CPT

## 2022-09-12 PROCEDURE — 36415 COLL VENOUS BLD VENIPUNCTURE: CPT

## 2022-09-12 PROCEDURE — 85652 RBC SED RATE AUTOMATED: CPT

## 2022-09-12 PROCEDURE — 82746 ASSAY OF FOLIC ACID SERUM: CPT

## 2022-09-12 PROCEDURE — 84436 ASSAY OF TOTAL THYROXINE: CPT

## 2022-09-12 PROCEDURE — 86200 CCP ANTIBODY: CPT

## 2022-09-12 PROCEDURE — 86140 C-REACTIVE PROTEIN: CPT

## 2022-09-12 PROCEDURE — 82550 ASSAY OF CK (CPK): CPT

## 2022-09-13 LAB
H PYLORI IGA SER-ACNC: <9 UNITS (ref 0–8.9)
H PYLORI IGG SER IA-ACNC: 0.16 INDEX VALUE (ref 0–0.79)
H PYLORI IGM SER-ACNC: <9 UNITS (ref 0–8.9)

## 2022-09-14 LAB
ANA HOMOGEN TITR SER: ABNORMAL {TITER}
ANA SER QL IF: POSITIVE
CCP IGA+IGG SERPL IA-ACNC: 2 UNITS (ref 0–19)
Lab: ABNORMAL

## 2022-10-03 ENCOUNTER — DISEASE STATE MANAGEMENT VISIT (OUTPATIENT)
Dept: PHARMACY | Facility: HOSPITAL | Age: 62
End: 2022-10-03

## 2022-10-03 ENCOUNTER — SPECIALTY PHARMACY (OUTPATIENT)
Dept: PHARMACY | Facility: HOSPITAL | Age: 62
End: 2022-10-03

## 2022-10-03 VITALS
DIASTOLIC BLOOD PRESSURE: 85 MMHG | BODY MASS INDEX: 30.74 KG/M2 | HEIGHT: 66 IN | HEART RATE: 65 BPM | SYSTOLIC BLOOD PRESSURE: 154 MMHG | WEIGHT: 191.3 LBS

## 2022-10-03 PROBLEM — E66.9 CLASS 1 OBESITY WITHOUT SERIOUS COMORBIDITY WITH BODY MASS INDEX (BMI) OF 30.0 TO 30.9 IN ADULT: Status: ACTIVE | Noted: 2022-10-03

## 2022-10-03 PROBLEM — E66.811 CLASS 1 OBESITY WITHOUT SERIOUS COMORBIDITY WITH BODY MASS INDEX (BMI) OF 30.0 TO 30.9 IN ADULT: Status: ACTIVE | Noted: 2022-10-03

## 2022-10-03 RX ORDER — LIRAGLUTIDE 6 MG/ML
INJECTION, SOLUTION SUBCUTANEOUS
Qty: 15 ML | Refills: 0 | Status: SHIPPED | OUTPATIENT
Start: 2022-10-03 | End: 2022-10-31 | Stop reason: SDUPTHER

## 2022-10-03 RX ORDER — PEN NEEDLE, DIABETIC 30 GX3/16"
1 NEEDLE, DISPOSABLE MISCELLANEOUS DAILY
Qty: 100 EACH | Refills: 0 | Status: SHIPPED | OUTPATIENT
Start: 2022-10-03

## 2022-10-03 NOTE — PROGRESS NOTES
[facility]ication Management Clinic  Weight Management Program        Jamia Curtis is a 62 y.o. female referred to the [facility]ication Management Clinic by Dr. Jarocho Bunch for clinical pharmacy and specialty pharmacy management of [facility] for weight management.  Jamia Curtis has previously tried walking Weight Watchers, and SlimFast for weight loss.  Current weight loss efforts include lifestyle changes. The patient denies a person history or family history of thyroid cancer and denies a personal history of pancreatitis.     Relevant Past Medical History and Co-morbidities  No past medical history on file.  Social History     Socioeconomic History   • Marital status:        Allergies  Patient has no known allergies.    Current Medication List    Current Outpatient Medications:   •  celecoxib (CeleBREX) 200 MG capsule, Take 1 capsule by mouth 2 times every day as needed for joint pain., Disp: 180 capsule, Rfl: 3  •  Insulin Pen Needle (Pen Needles) 32G X 4 MM misc, 1 each Daily., Disp: 100 each, Rfl: 0  •  Liraglutide (Saxenda) 18 MG/3ML injection pen, Inject 0.6 mg under the skin Daily for 7 days, THEN 1.2 mg Daily for 7 days, THEN 1.8 mg Daily for 7 days, THEN 2.4 mg Daily for 7 days, and THEN 3 mg Daily for 30 days., Disp: 15 mL, Rfl: 0  •  pantoprazole (PROTONIX) 40 MG EC tablet, Take 1 tablet by mouth daily., Disp: 90 tablet, Rfl: 3  •  traZODone (DESYREL) 50 MG tablet, Take 1 tablet by mouth at bedtime as needed for insomnia., Disp: 90 tablet, Rfl: 1    Drug Interactions  None    Relevant Laboratory Values  Lab Results   Component Value Date    CHOL 172 09/12/2022    CHLPL 194 08/26/2020    TRIG 68 09/12/2022    HDL 54 09/12/2022     (H) 09/12/2022     Body mass index is 30.88 kg/m².    Vaccinations:   Patient recommended to keep up with routine vaccinations.     Goals of Therapy  • Clinical Goals or Therapeutic Targets: 10% weight loss goal      Date 10/03/22         Weight (lb) 191.3        BMI kg/ 30.87       Waist Circumference (in)  40.5           Medication Assessment & Plan    Patient's current BMI is 30.87, which is considered Class 1 Obesity.    Will order Saxenda 0.6 mg SC daily x 1 week, then increase to 1.2mg SC daily x 1 week, then increase to 1.8mg SC daily x 1 week, then increase to 2.4mg SC daily x 1 week, then increase to 3mg SC daily. If at anytime the patient cannot tolerate an increased dose during dose escalation, instructed Pt to delay dose escalation for 1 week and call clinic.        The following medications will need dose adjustments/closer monitoring once the GLP1 is started: None    Discussed lifestyle modifications, including diet and exercise.  Patient education provided.     Worked with patient to create SMART Goal(s):   1. Drink 8 cups (2 qts) of water every day  2. Drink zero calories or cutting down from 4 to 1-2 soda cans a day  3. Walking on treadmill 30 min 3x a week    Will follow-up with patient in clinic in 4 weeks.     Medication Education:    SAXENDA® (liraglutide)  Medication Expectations   Why am I taking this medication? You are taking Saxenda for weight loss because you have excess weight and also have weight-related medical problems or obesity. It should be used along with a reduced calorie diet and increased physical activity.    What should I expect while on this medication? You should expect to lose at least 4% of your body weight after 4 months of therapy. It can also help keep weight lost off.    How does the medication work? Saxenda is an injection that addresses one of your body's natural responses to weight loss. It works like a naturally produced hormone in the body called GLP-1 that regulates appetite which can lead to eating fewer calories and losing weight. This medication also slows down food from leaving your stomach, making you feel landon for longer.   How long will I be on this medication for? The amount of time you will be on this  medication will be determined by your doctor. Do not abruptly stop this medication without talking to your doctor first.    How do I take this medication? Take as directed on your prescription label. Saxenda is injected under the skin (subcutaneously) of your stomach, thigh, or upper arm. This medication should be taken once daily and can be given with or without food. Use a different injection site in the same body region each day.     For each new prefilled pen, prime the needle before the first injection by turning the dose selector to the flow check symbol and injecting into the air (priming is not required for subsequent injections). Use a new needle for each injection. Once the needle is inserted, continue to press the button until the dial has returned to 0 and for an additional 6 seconds. Then remove the needle and discard.    What are some possible side effects? You may notice you don't feel as hungry, especially when you first start using Saxenda. Some common side effects include nausea, vomiting, diarrhea, vomiting, indigestion, constipation, tiredness, and headache. Redness, itching, and/or swelling can occur where the shot was given. You should also monitor for low blood sugar (hypoglycemia), especially if you are taking Saxenda with other medications that cause low blood sugar. Stop using Saxenda and call your doctor immediately if you have severe pain in your stomach area that will not go away as this could be a sign of pancreatitis (inflammation of your pancreas).     What happens if I miss a dose? If you miss a dose, take it as soon as you remember. If it is close to your next dose, skip it and go back to your regular dosing schedule. Never take 2 doses at once. If you miss your dose for 3 days or more, call your doctor to talk about how to restart Saxenda.      Medication Safety   What are things I should warn my doctor immediately about? Do not use Saxenda if you or a family member have ever had  medullary thyroid cancer (MTC) or Multiple Endocrine Neoplasia syndrome type 2 (MEN 2). Tell your doctor if you get a lump or swelling in your neck, hoarseness, difficulty swallowing, or feel short of breath (these may be symptoms of thyroid cancer). Talk to your doctor if you have or have had problems with your pancreas, kidneys, or liver. Tell your doctor if you have problems with digesting food or slowed emptying of your stomach (gastroparesis). Talk to your doctor if you are pregnant, planning to become pregnant, or breastfeeding. Also tell your doctor if you notice any signs/symptoms of an allergic reaction (rash, hives, difficulty breathing, etc.).   What are things that I should be cautious of? Be cautious of any side effects from this medication. Talk to your doctor if any new ones develop or aren't getting better.   What are some medications that can interact with this one? Taking Saxenda with other medications that lower your blood sugar such as insulins and glipizide/glimepiride/glyburide may increase the risk of low blood sugar. It should not be taken with other medicines called GLP-1 receptor agonists, as these work the same way as Saxenda. Because Saxenda slows stomach emptying, it can affect the way some medicines work. Always tell your doctor or pharmacist immediately if you start taking any new medications, including over-the-counter medications, vitamins, and herbal supplements.      Medication Storage/Handling   How should I handle this medication? Keep this medication out of reach of pets/children and keep the pen capped when not in use. Do not share your medicine pens with others.    How does this medication need to be stored? Store your new, unused pens in the original carton in the refrigerator. Protect it from light. Do not freeze. You may store your opened pen at room temperature or in the refrigerator for up to 30 days. Always remove the needle from the pen before storing.    How should I  dispose of this medication? Used Saxenda pens should be thrown away after 30 days. Place your used pen and needle in an approved sharps container. If you do not have a sharps container, you may use a household container made of heavy-duty plastic with a tight-fitting lid that is leak resistant (e.g., heavy-duty plastic laundry detergent bottle). If your doctor decides to stop this medication, take to your local police station for proper disposal. Some pharmacies also have take-back bins for medication drop-off.       Resources/Support   How can I remind myself to take this medication? You can download reminder apps to help you manage your refills. You may also set an alarm on your phone to remind you.    Is financial support available?  [employer] can provide co-pay cards if you have commercial insurance.    Which vaccines are recommended for me? Talk to your doctor about these vaccines: Flu, Coronavirus (COVID-19), Pneumococcal (pneumonia), Tdap, Zoster (shingles)          Vera Narvaez RPH  10/3/2022  10:26 EDT

## 2022-10-03 NOTE — PROGRESS NOTES
Medication Management Clinic  Weight Management Program        Jamia Curtis is a 62 y.o. female referred to the Medication Management Clinic by Dr. Jarocho Bunch for clinical pharmacy and specialty pharmacy management of [facility] for weight management.  Jaima Curtis has previously tried walking Weight Watchers, and SlimFast for weight loss.  Current weight loss efforts include lifestyle changes. The patient denies a person history or family history of thyroid cancer and denies a personal history of pancreatitis.     Relevant Past Medical History and Co-morbidities  No past medical history on file.  Social History     Socioeconomic History   • Marital status:        Allergies  Patient has no known allergies.    Current Medication List    Current Outpatient Medications:   •  amoxicillin (AMOXIL) 500 MG capsule, Take 1 capsule by mouth every 8 hours, Disp: 21 capsule, Rfl: 0  •  celecoxib (CeleBREX) 200 MG capsule, Take 1 capsule by mouth 2 times every day as needed for joint pain., Disp: 180 capsule, Rfl: 3  •  nitrofurantoin, macrocrystal-monohydrate, (MACROBID) 100 MG capsule, Take 1 capsule by mouth Every 12 (Twelve) Hours with food., Disp: 14 capsule, Rfl: 0  •  pantoprazole (PROTONIX) 40 MG EC tablet, Take 1 tablet by mouth daily., Disp: 90 tablet, Rfl: 3  •  permethrin (ELIMITE) 5 % cream, Apply one time on trunk and extremities after warm shower, put on and wash off in morning, Disp: 60 g, Rfl: 0  •  traZODone (DESYREL) 50 MG tablet, Take 1 tablet by mouth at bedtime as needed for insomnia., Disp: 90 tablet, Rfl: 1    Drug Interactions  None    Relevant Laboratory Values  Lab Results   Component Value Date    CHOL 172 09/12/2022    CHLPL 194 08/26/2020    TRIG 68 09/12/2022    HDL 54 09/12/2022     (H) 09/12/2022     There is no height or weight on file to calculate BMI.    Vaccinations:   Patient recommended to keep up with routine vaccinations.     Goals of Therapy  • Clinical  Goals or Therapeutic Targets: 10% weight loss goal      Date 10/03/22         Weight (lb) 191.3       BMI kg/ 30.87       Waist Circumference (in)  40.5           Medication Assessment & Plan    Patient's current BMI is 30.87, which is considered Class 1 Obesity.    Will order Saxenda 0.6 mg SC daily x 1 week, then increase to 1.2mg SC daily x 1 week, then increase to 1.8mg SC daily x 1 week, then increase to 2.4mg SC daily x 1 week, then increase to 3mg SC daily. If at anytime the patient cannot tolerate an increased dose during dose escalation, instructed Pt to delay dose escalation for 1 week and call clinic.        The following medications will need dose adjustments/closer monitoring once the GLP1 is started: None    Discussed lifestyle modifications, including diet and exercise.  Patient education provided.     Worked with patient to create SMART Goal(s):   1. Drink 8 cups (2 qts) of water every day  2. Drink zero calories or cutting down from 4 to 1-2 soda cans a day  3. Walking on treadmill 30 min 3x a week    Will follow-up with patient in clinic in 4 weeks.     Medication Education:    SAXENDA® (liraglutide)  Medication Expectations   Why am I taking this medication? You are taking Saxenda for weight loss because you have excess weight and also have weight-related medical problems or obesity. It should be used along with a reduced calorie diet and increased physical activity.    What should I expect while on this medication? You should expect to lose at least 4% of your body weight after 4 months of therapy. It can also help keep weight lost off.    How does the medication work? Saxenda is an injection that addresses one of your body's natural responses to weight loss. It works like a naturally produced hormone in the body called GLP-1 that regulates appetite which can lead to eating fewer calories and losing weight. This medication also slows down food from leaving your stomach, making you feel landon for  longer.   How long will I be on this medication for? The amount of time you will be on this medication will be determined by your doctor. Do not abruptly stop this medication without talking to your doctor first.    How do I take this medication? Take as directed on your prescription label. Saxenda is injected under the skin (subcutaneously) of your stomach, thigh, or upper arm. This medication should be taken once daily and can be given with or without food. Use a different injection site in the same body region each day.     For each new prefilled pen, prime the needle before the first injection by turning the dose selector to the flow check symbol and injecting into the air (priming is not required for subsequent injections). Use a new needle for each injection. Once the needle is inserted, continue to press the button until the dial has returned to 0 and for an additional 6 seconds. Then remove the needle and discard.    What are some possible side effects? You may notice you don't feel as hungry, especially when you first start using Saxenda. Some common side effects include nausea, vomiting, diarrhea, vomiting, indigestion, constipation, tiredness, and headache. Redness, itching, and/or swelling can occur where the shot was given. You should also monitor for low blood sugar (hypoglycemia), especially if you are taking Saxenda with other medications that cause low blood sugar. Stop using Saxenda and call your doctor immediately if you have severe pain in your stomach area that will not go away as this could be a sign of pancreatitis (inflammation of your pancreas).     What happens if I miss a dose? If you miss a dose, take it as soon as you remember. If it is close to your next dose, skip it and go back to your regular dosing schedule. Never take 2 doses at once. If you miss your dose for 3 days or more, call your doctor to talk about how to restart Saxenda.      Medication Safety   What are things I should  warn my doctor immediately about? Do not use Saxenda if you or a family member have ever had medullary thyroid cancer (MTC) or Multiple Endocrine Neoplasia syndrome type 2 (MEN 2). Tell your doctor if you get a lump or swelling in your neck, hoarseness, difficulty swallowing, or feel short of breath (these may be symptoms of thyroid cancer). Talk to your doctor if you have or have had problems with your pancreas, kidneys, or liver. Tell your doctor if you have problems with digesting food or slowed emptying of your stomach (gastroparesis). Talk to your doctor if you are pregnant, planning to become pregnant, or breastfeeding. Also tell your doctor if you notice any signs/symptoms of an allergic reaction (rash, hives, difficulty breathing, etc.).   What are things that I should be cautious of? Be cautious of any side effects from this medication. Talk to your doctor if any new ones develop or aren't getting better.   What are some medications that can interact with this one? Taking Saxenda with other medications that lower your blood sugar such as insulins and glipizide/glimepiride/glyburide may increase the risk of low blood sugar. It should not be taken with other medicines called GLP-1 receptor agonists, as these work the same way as Saxenda. Because Saxenda slows stomach emptying, it can affect the way some medicines work. Always tell your doctor or pharmacist immediately if you start taking any new medications, including over-the-counter medications, vitamins, and herbal supplements.      Medication Storage/Handling   How should I handle this medication? Keep this medication out of reach of pets/children and keep the pen capped when not in use. Do not share your medicine pens with others.    How does this medication need to be stored? Store your new, unused pens in the original carton in the refrigerator. Protect it from light. Do not freeze. You may store your opened pen at room temperature or in the  refrigerator for up to 30 days. Always remove the needle from the pen before storing.    How should I dispose of this medication? Used Saxenda pens should be thrown away after 30 days. Place your used pen and needle in an approved sharps container. If you do not have a sharps container, you may use a household container made of heavy-duty plastic with a tight-fitting lid that is leak resistant (e.g., heavy-duty plastic laundry detergent bottle). If your doctor decides to stop this medication, take to your local police station for proper disposal. Some pharmacies also have take-back bins for medication drop-off.       Resources/Support   How can I remind myself to take this medication? You can download reminder apps to help you manage your refills. You may also set an alarm on your phone to remind you.    Is financial support available?  [employer] can provide co-pay cards if you have commercial insurance.    Which vaccines are recommended for me? Talk to your doctor about these vaccines: Flu, Coronavirus (COVID-19), Pneumococcal (pneumonia), Tdap, Zoster (shingles)          Vera Narvaez RP  10/3/2022  09:51 EDT

## 2022-10-05 ENCOUNTER — HOSPITAL ENCOUNTER (OUTPATIENT)
Dept: MAMMOGRAPHY | Facility: HOSPITAL | Age: 62
Discharge: HOME | End: 2022-10-05

## 2022-10-05 ENCOUNTER — HOSPITAL ENCOUNTER (OUTPATIENT)
Dept: BONE DENSITY | Facility: HOSPITAL | Age: 62
Discharge: HOME | End: 2022-10-05

## 2022-10-05 DIAGNOSIS — Z78.0 MENOPAUSE: ICD-10-CM

## 2022-10-05 DIAGNOSIS — Z12.31 VISIT FOR SCREENING MAMMOGRAM: ICD-10-CM

## 2022-10-05 PROCEDURE — 77067 SCR MAMMO BI INCL CAD: CPT

## 2022-10-05 PROCEDURE — 77080 DXA BONE DENSITY AXIAL: CPT | Performed by: RADIOLOGY

## 2022-10-05 PROCEDURE — 77063 BREAST TOMOSYNTHESIS BI: CPT

## 2022-10-05 PROCEDURE — 77067 SCR MAMMO BI INCL CAD: CPT | Performed by: RADIOLOGY

## 2022-10-05 PROCEDURE — 77063 BREAST TOMOSYNTHESIS BI: CPT | Performed by: RADIOLOGY

## 2022-10-05 PROCEDURE — 77080 DXA BONE DENSITY AXIAL: CPT

## 2022-10-28 ENCOUNTER — OFFICE VISIT (OUTPATIENT)
Dept: GASTROENTEROLOGY | Facility: CLINIC | Age: 62
End: 2022-10-28

## 2022-10-28 VITALS — WEIGHT: 179.6 LBS | HEIGHT: 66 IN | BODY MASS INDEX: 28.87 KG/M2

## 2022-10-28 DIAGNOSIS — K59.04 CHRONIC IDIOPATHIC CONSTIPATION: ICD-10-CM

## 2022-10-28 DIAGNOSIS — K21.9 GASTROESOPHAGEAL REFLUX DISEASE WITHOUT ESOPHAGITIS: Primary | ICD-10-CM

## 2022-10-28 PROCEDURE — 99204 OFFICE O/P NEW MOD 45 MIN: CPT | Performed by: PHYSICIAN ASSISTANT

## 2022-10-28 NOTE — PROGRESS NOTES
Chief Complaint   Patient presents with   • Heartburn       Jamia Curtis is a 62 y.o. female who presents to the office today for evaluation of Heartburn  .      HPI  She is a new patient presenting for heartburn and nausea.  Dr. Bunch has referred the patient for an EGD.     The patient reports that she is having heartburn. She just established care with Dr. Bunch. The patient was prescribed Protonix 6 weeks ago by Dr. Bunch. She notes that the Protonix is beneficial. The patient states that she was eating Tums all day but with current medication does not have to do this any longer. She is also taking Saxenda for weight loss. The patients dose of Saxenda is 2.4.  Saxenda is causing some occasional issues with constipation in which she will go 2 to 3 days in between bowel movements.    She states that she will have heartburn after eating spicy foods even while taking the Protonix.  She does try to avoid this most of the time however will occasionally have flareups.    She notes that she has gained weight. The patient states that she would have difficulty swallowing. She notes that she has lost some weight while taking Saxenda. The patient reports that she is nauseated and concerned it may be from the Saxenda.     The patient reports that her brother had esophageal cancer. Her brother drank and smoked.     She had a history of Hep C in 2013; adding that she was stuck with an IV needle. The patient states that she was on medication for Hep C. The patient notes that it has been nondetectable. Her treatment ended in 2014.       Review of Systems   Constitutional: Negative for fever.   HENT: Negative for trouble swallowing.    Eyes: Negative.    Respiratory: Negative.    Cardiovascular: Negative.    Gastrointestinal: Positive for nausea. Negative for abdominal distention, abdominal pain, anal bleeding, blood in stool, constipation, diarrhea and vomiting.   Endocrine: Negative.    Genitourinary: Negative.   "  Musculoskeletal: Negative.    Skin: Negative.    Allergic/Immunologic: Negative.    Neurological: Negative.    Hematological: Negative.    Psychiatric/Behavioral: Negative.        ACTIVE PROBLEMS:   Specialty Problems    None      PAST MEDICAL HISTORY:  History reviewed. No pertinent past medical history.    SURGICAL HISTORY:  History reviewed. No pertinent surgical history.    FAMILY HISTORY:  Family History   Problem Relation Age of Onset   • Breast cancer Mother 62       SOCIAL HISTORY:  Social History     Tobacco Use   • Smoking status: Not on file   • Smokeless tobacco: Not on file   Substance Use Topics   • Alcohol use: Not on file       CURRENT MEDICATION:    Current Outpatient Medications:   •  celecoxib (CeleBREX) 200 MG capsule, Take 1 capsule by mouth 2 times every day as needed for joint pain., Disp: 180 capsule, Rfl: 3  •  diclofenac (CATAFLAM) 50 MG tablet, Take 1 tablet by mouth 2 times every day as needed for neck pain, Disp: 100 tablet, Rfl: 1  •  Insulin Pen Needle (Pen Needles) 32G X 4 MM misc, 1 each Daily., Disp: 100 each, Rfl: 0  •  ondansetron ODT (ZOFRAN-ODT) 4 MG disintegrating tablet, Place 1 tablet on top of the tongue where it will dissolve then swallow every 8 hours as needed for nausea., Disp: 30 tablet, Rfl: 1  •  pantoprazole (PROTONIX) 40 MG EC tablet, Take 1 tablet by mouth daily., Disp: 90 tablet, Rfl: 3  •  traZODone (DESYREL) 50 MG tablet, Take 1 tablet by mouth at bedtime as needed for insomnia., Disp: 90 tablet, Rfl: 1  •  Liraglutide (Saxenda) 18 MG/3ML injection pen, Inject 3 mg under the skin into the appropriate area as directed Daily., Disp: 15 mL, Rfl: 0    ALLERGIES:  Patient has no known allergies.    VISIT VITALS:  Ht 167.6 cm (66\")   Wt 81.5 kg (179 lb 9.6 oz)   BMI 28.99 kg/m²   Physical Exam  Constitutional:       General: She is not in acute distress.     Appearance: Normal appearance. She is well-developed.   HENT:      Head: Normocephalic and atraumatic. "   Eyes:      Pupils: Pupils are equal, round, and reactive to light.   Cardiovascular:      Rate and Rhythm: Normal rate and regular rhythm.      Heart sounds: Normal heart sounds.   Pulmonary:      Effort: Pulmonary effort is normal. No respiratory distress.      Breath sounds: Normal breath sounds. No wheezing, rhonchi or rales.   Abdominal:      General: Abdomen is flat. Bowel sounds are normal. There is no distension.      Palpations: Abdomen is soft. There is no mass.      Tenderness: There is no abdominal tenderness. There is no guarding or rebound.      Hernia: No hernia is present.   Musculoskeletal:         General: No swelling. Normal range of motion.      Cervical back: Normal range of motion and neck supple.      Right lower leg: No edema.      Left lower leg: No edema.   Skin:     General: Skin is warm and dry.   Neurological:      Mental Status: She is alert and oriented to person, place, and time.   Psychiatric:         Attention and Perception: Attention normal.         Mood and Affect: Mood normal.         Speech: Speech normal.         Behavior: Behavior normal. Behavior is cooperative.         Thought Content: Thought content normal.         Assessment    We discussed PPI versus H2 therapy.  Patient will continue on her Protonix in order to control any heartburn/reflux symptoms she may have.  Patient was offered an EGD however at this time declined-if symptoms worsen we will look at scheduling procedure.  She may try MiraLAX if needed for occasional constipation. .      Diagnosis Plan   1. Gastroesophageal reflux disease without esophagitis        2. Chronic idiopathic constipation            Return if symptoms worsen or fail to improve.               This document has been electronically signed by Al Lea PA-C  October 31, 2022 08:47 EDT    Part of this note may be an electronic transcription/translation of spoken language to printed text using the Dragon Dictation  System.    Transcribed from ambient dictation for Al Lea PA-C by Vera Howell.  10/28/22   15:25 EDT    Patient or patient representative verbalized consent to the visit recording.  I have personally performed the services described in this document as transcribed by the above individual, and it is both accurate and complete.

## 2022-10-31 ENCOUNTER — SPECIALTY PHARMACY (OUTPATIENT)
Dept: PHARMACY | Facility: HOSPITAL | Age: 62
End: 2022-10-31

## 2022-10-31 ENCOUNTER — DISEASE STATE MANAGEMENT VISIT (OUTPATIENT)
Dept: PHARMACY | Facility: HOSPITAL | Age: 62
End: 2022-10-31

## 2022-10-31 VITALS
SYSTOLIC BLOOD PRESSURE: 145 MMHG | DIASTOLIC BLOOD PRESSURE: 70 MMHG | HEART RATE: 70 BPM | WEIGHT: 178.6 LBS | BODY MASS INDEX: 28.83 KG/M2

## 2022-10-31 RX ORDER — LIRAGLUTIDE 6 MG/ML
3 INJECTION, SOLUTION SUBCUTANEOUS DAILY
Qty: 15 ML | Refills: 0 | Status: SHIPPED | OUTPATIENT
Start: 2022-10-31 | End: 2022-11-29 | Stop reason: SDUPTHER

## 2022-10-31 NOTE — PROGRESS NOTES
"   [facility]  Weight Management Program        Jamia Curtis is a 62 y.o. female referred to the [facility] by Dr. Jarocho Bunch for clinical pharmacy and specialty pharmacy management of [facility] for weight management.  Jamia Curtis has previously tried walking Weight Watchers, and SlimFast for weight loss.  Current weight loss efforts include lifestyle changes and Saxenda. Pt reports just starting the 3mg dose today. The patient denies a person history or family history of thyroid cancer and denies a personal history of pancreatitis. Pt reports some mild nausea that is relieved with crackers and pt does reports that she has some zofran on hand if needed. She reports that she is not \"pooping\" as much as usual and has picked up a fiber supplement to help. She denies any other side effects at this time. She is able to give the injection with ease daily and denies missing any doses. She reports that she is meeting half of her water intake goal currently and has decreased the amount of soda and coffee that she has been drinking by half. She has met her walking goal.     Relevant Past Medical History and Co-morbidities  No past medical history on file.  Social History     Socioeconomic History   • Marital status:        Allergies  Patient has no known allergies.    Current Medication List    Current Outpatient Medications:   •  celecoxib (CeleBREX) 200 MG capsule, Take 1 capsule by mouth 2 times every day as needed for joint pain., Disp: 180 capsule, Rfl: 3  •  diclofenac (CATAFLAM) 50 MG tablet, Take 1 tablet by mouth 2 times every day as needed for neck pain, Disp: 100 tablet, Rfl: 1  •  Insulin Pen Needle (Pen Needles) 32G X 4 MM misc, 1 each Daily., Disp: 100 each, Rfl: 0  •  Liraglutide (Saxenda) 18 MG/3ML injection pen, Inject 0.6 mg under the skin Daily for 7 days, THEN 1.2 mg Daily for 7 days, THEN 1.8 mg Daily for 7 days, THEN 2.4 mg Daily for 7 days, " and THEN 3 mg Daily for 30 days., Disp: 15 mL, Rfl: 0  •  ondansetron ODT (ZOFRAN-ODT) 4 MG disintegrating tablet, Place 1 tablet on top of the tongue where it will dissolve then swallow every 8 hours as needed for nausea., Disp: 30 tablet, Rfl: 1  •  pantoprazole (PROTONIX) 40 MG EC tablet, Take 1 tablet by mouth daily., Disp: 90 tablet, Rfl: 3  •  traZODone (DESYREL) 50 MG tablet, Take 1 tablet by mouth at bedtime as needed for insomnia., Disp: 90 tablet, Rfl: 1    Drug Interactions  None    Relevant Laboratory Values  Lab Results   Component Value Date    CHOL 172 09/12/2022    CHLPL 194 08/26/2020    TRIG 68 09/12/2022    HDL 54 09/12/2022     (H) 09/12/2022     There is no height or weight on file to calculate BMI.    Vaccinations:   Patient recommended to keep up with routine vaccinations.     Goals of Therapy  • Clinical Goals or Therapeutic Targets: 10% weight loss goal      Date 10/03/22   10/31/22      Weight (lb) 191.3lbs 178.6lbs      BMI kg/ 30.87 28.82      Waist Circumference (in)  40.5in 41in          Medication Assessment & Plan    Patient's current BMI is 28.82, which is considered Overweight. Pt has lost 12.7lb. Congratulated her on her success.       Will continue Saxenda 3mg SC daily. If at anytime the patient cannot tolerate an increased dose during dose escalation, instructed Pt to delay dose escalation for 1 week and call clinic.        The following medications will need dose adjustments/closer monitoring once the GLP1 is started: None    Pt reports switching from Celebrex to Mobic per Dr. Bunch. Counseled pt to increase her water intake when starting her fiber supplement.     Discussed lifestyle modifications, including diet and exercise.      Worked with patient to create SMART Goal(s): Pt will continue to try to met these goals  1. Drink 8 cups (2 qts) of water every day  2. Drink zero calories or cutting down from 4 to 1-2 soda cans a day  3. Walking on treadmill 30 min 3x a  week    Will follow-up with patient in clinic in 4 weeks.       Debby Kim RPH  10/31/2022  08:02 EDT

## 2022-11-28 ENCOUNTER — APPOINTMENT (OUTPATIENT)
Dept: PHARMACY | Facility: HOSPITAL | Age: 62
End: 2022-11-28

## 2022-11-29 ENCOUNTER — HOSPITAL ENCOUNTER (OUTPATIENT)
Dept: MAMMOGRAPHY | Facility: HOSPITAL | Age: 62
Discharge: HOME | End: 2022-11-29

## 2022-11-29 ENCOUNTER — DISEASE STATE MANAGEMENT VISIT (OUTPATIENT)
Dept: PHARMACY | Facility: HOSPITAL | Age: 62
End: 2022-11-29

## 2022-11-29 ENCOUNTER — HOSPITAL ENCOUNTER (OUTPATIENT)
Dept: ULTRASOUND IMAGING | Facility: HOSPITAL | Age: 62
Discharge: HOME | End: 2022-11-29

## 2022-11-29 VITALS
WEIGHT: 178.2 LBS | HEART RATE: 77 BPM | DIASTOLIC BLOOD PRESSURE: 84 MMHG | SYSTOLIC BLOOD PRESSURE: 170 MMHG | BODY MASS INDEX: 28.76 KG/M2

## 2022-11-29 DIAGNOSIS — R92.8 ABNORMAL MAMMOGRAM OF LEFT BREAST: ICD-10-CM

## 2022-11-29 DIAGNOSIS — E66.9 CLASS 1 OBESITY WITHOUT SERIOUS COMORBIDITY WITH BODY MASS INDEX (BMI) OF 30.0 TO 30.9 IN ADULT, UNSPECIFIED OBESITY TYPE: Primary | ICD-10-CM

## 2022-11-29 PROCEDURE — 77061 BREAST TOMOSYNTHESIS UNI: CPT | Performed by: RADIOLOGY

## 2022-11-29 PROCEDURE — G0279 TOMOSYNTHESIS, MAMMO: HCPCS

## 2022-11-29 PROCEDURE — 77065 DX MAMMO INCL CAD UNI: CPT | Performed by: RADIOLOGY

## 2022-11-29 PROCEDURE — 77065 DX MAMMO INCL CAD UNI: CPT

## 2022-11-29 NOTE — PROGRESS NOTES
[facility]ication Management Clinic  Weight Management Program        Jamia Curtis is a 62 y.o. female referred to the Medication Management Clinic by Dr. Jarocho Bunch for clinical pharmacy and specialty pharmacy management of [facility] for weight management.  Jamia Curtis has previously tried walking Weight Watchers, and SlimFast for weight loss.  Current weight loss efforts include lifestyle changes and Saxenda. The patient denies a person history or family history of thyroid cancer and denies a personal history of pancreatitis. Denies swelling/lumps/hoarseness in throat or severe abdominal pain.     Patient reports no issues with medication, she is on 3 mg. She experiences nausea every once in a while but is able to eat a cracker for relief and has Zofran on hand if needed. She reported issues with constipation. She has been taking fiber and stool softener morning and night and bought Miralax which has been helping and denies side effect impacting quality of life. She states that she missed 2 days of injection while on a cruise because she was having issue with pen needles. She was able to give injection on 3rd day and denied side effects when administered. Patient was on vacation for 2 weeks and got off track with goals but is going to start working toward goals again. Patients BP was elevated at visit today which the patient states is not normal for her, patient is agreeable to monitor BP at home.      Relevant Past Medical History and Co-morbidities  No past medical history on file.  Social History     Socioeconomic History   • Marital status:        Allergies  Patient has no known allergies.    Current Medication List    Current Outpatient Medications:   •  celecoxib (CeleBREX) 200 MG capsule, Take 1 capsule by mouth 2 times every day as needed for joint pain., Disp: 180 capsule, Rfl: 3  •  diclofenac (CATAFLAM) 50 MG tablet, Take 1 tablet by mouth 2 times every day as needed for neck pain,  Disp: 100 tablet, Rfl: 1  •  Insulin Pen Needle (Pen Needles) 32G X 4 MM misc, 1 each Daily., Disp: 100 each, Rfl: 0  •  Liraglutide (Saxenda) 18 MG/3ML injection pen, Inject 3 mg under the skin into the appropriate area as directed Daily., Disp: 15 mL, Rfl: 0  •  ondansetron ODT (ZOFRAN-ODT) 4 MG disintegrating tablet, Place 1 tablet on top of the tongue where it will dissolve then swallow every 8 hours as needed for nausea., Disp: 30 tablet, Rfl: 1  •  pantoprazole (PROTONIX) 40 MG EC tablet, Take 1 tablet by mouth daily., Disp: 90 tablet, Rfl: 3  •  traZODone (DESYREL) 50 MG tablet, Take 1 tablet by mouth at bedtime as needed for insomnia., Disp: 90 tablet, Rfl: 1    Drug Interactions  None    Relevant Laboratory Values  Lab Results   Component Value Date    CHOL 172 09/12/2022    CHLPL 194 08/26/2020    TRIG 68 09/12/2022    HDL 54 09/12/2022     (H) 09/12/2022     There is no height or weight on file to calculate BMI.    Vaccinations:   Patient recommended to keep up with routine vaccinations.     Goals of Therapy  • Clinical Goals or Therapeutic Targets: 10% weight loss goal    Date 10/03/22 10/31/22 11/29/22     Weight (lb) 191.3lbs 178.6lbs 178.2 lbs     BMI kg/ 30.87 28.82 28.76     Waist Circumference (in)  40.5in 41in 36 in         Medication Assessment & Plan    Patient's current BMI is 28.76, which is considered Overweight. Pt has lost 13.1lbs overall. Congratulated her on her success.     Will continue Saxenda 3mg SC daily. If at anytime the patient cannot tolerate an increased dose during dose escalation, instructed Pt to delay dose escalation for 1 week and call clinic.        The following medications will need dose adjustments/closer monitoring once the GLP1 is started: None    Pt reports switching from Celebrex to Mobic per Dr. Bunch. Counseled pt to increase her water intake when starting her fiber supplement.     Patient's BP was elevated at visit today, was unable to re-check prior to  end of appointment. Attempted to call patient after she left to instruct to monitor BP but was unable to reach and left message to call back. Re-evaluate at next appointment and if elevated again, recommend follow up with PCP or going to emergency room if appropriate. Called patient after visit to advise her to monitor BP at home or at work and if trending elevated to refer to PCP and if significantly elevated to report to ER, patient agreeable.     Patient due for labs and follow-up with referring provider. Will order lipid panel, CMP, A1c, thyroid panel. Instructed patient to fast prior to lab draw and follow up with provider prior to next appointment.     Discussed lifestyle modifications, including diet and exercise.      Worked with patient to create SMART Goal(s): Pt will continue to try to met these goals  1. Drink 8 cups (2 qts) of water every day  2. Drink zero calories or cutting down from 4 to 1-2 soda cans a day  3. Walking on treadmill 30 min 3x a week    Will follow-up with patient in clinic in 4 weeks.       Cher Michael RPH  11/29/2022  09:14 EST

## 2023-01-09 ENCOUNTER — LAB (OUTPATIENT)
Dept: LAB | Facility: HOSPITAL | Age: 63
End: 2023-01-09
Payer: COMMERCIAL

## 2023-01-09 DIAGNOSIS — E66.9 CLASS 1 OBESITY WITHOUT SERIOUS COMORBIDITY WITH BODY MASS INDEX (BMI) OF 30.0 TO 30.9 IN ADULT, UNSPECIFIED OBESITY TYPE: ICD-10-CM

## 2023-01-09 LAB
ALBUMIN SERPL-MCNC: 3.8 G/DL (ref 3.5–5.2)
ALBUMIN/GLOB SERPL: 1.5 G/DL
ALP SERPL-CCNC: 98 U/L (ref 39–117)
ALT SERPL W P-5'-P-CCNC: 30 U/L (ref 1–33)
ANION GAP SERPL CALCULATED.3IONS-SCNC: 8.1 MMOL/L (ref 5–15)
AST SERPL-CCNC: 23 U/L (ref 1–32)
BILIRUB SERPL-MCNC: 0.3 MG/DL (ref 0–1.2)
BUN SERPL-MCNC: 10 MG/DL (ref 8–23)
BUN/CREAT SERPL: 14.5 (ref 7–25)
CALCIUM SPEC-SCNC: 8.5 MG/DL (ref 8.6–10.5)
CHLORIDE SERPL-SCNC: 107 MMOL/L (ref 98–107)
CHOLEST SERPL-MCNC: 180 MG/DL (ref 0–200)
CO2 SERPL-SCNC: 27.9 MMOL/L (ref 22–29)
CREAT SERPL-MCNC: 0.69 MG/DL (ref 0.57–1)
EGFRCR SERPLBLD CKD-EPI 2021: 98.9 ML/MIN/1.73
GLOBULIN UR ELPH-MCNC: 2.6 GM/DL
GLUCOSE SERPL-MCNC: 100 MG/DL (ref 65–99)
HBA1C MFR BLD: 5.3 % (ref 4.8–5.6)
HDLC SERPL-MCNC: 50 MG/DL (ref 40–60)
LDLC SERPL CALC-MCNC: 117 MG/DL (ref 0–100)
LDLC/HDLC SERPL: 2.32 {RATIO}
POTASSIUM SERPL-SCNC: 4.5 MMOL/L (ref 3.5–5.2)
PROT SERPL-MCNC: 6.4 G/DL (ref 6–8.5)
SODIUM SERPL-SCNC: 143 MMOL/L (ref 136–145)
TRIGL SERPL-MCNC: 71 MG/DL (ref 0–150)
VLDLC SERPL-MCNC: 13 MG/DL (ref 5–40)

## 2023-01-09 PROCEDURE — 80061 LIPID PANEL: CPT

## 2023-01-09 PROCEDURE — 83036 HEMOGLOBIN GLYCOSYLATED A1C: CPT

## 2023-01-09 PROCEDURE — 84443 ASSAY THYROID STIM HORMONE: CPT

## 2023-01-09 PROCEDURE — 36415 COLL VENOUS BLD VENIPUNCTURE: CPT

## 2023-01-09 PROCEDURE — 84436 ASSAY OF TOTAL THYROXINE: CPT

## 2023-01-09 PROCEDURE — 84479 ASSAY OF THYROID (T3 OR T4): CPT

## 2023-01-09 PROCEDURE — 80053 COMPREHEN METABOLIC PANEL: CPT

## 2023-01-10 ENCOUNTER — SPECIALTY PHARMACY (OUTPATIENT)
Dept: PHARMACY | Facility: HOSPITAL | Age: 63
End: 2023-01-10
Payer: COMMERCIAL

## 2023-01-10 ENCOUNTER — DISEASE STATE MANAGEMENT VISIT (OUTPATIENT)
Dept: PHARMACY | Facility: HOSPITAL | Age: 63
End: 2023-01-10
Payer: COMMERCIAL

## 2023-01-10 VITALS
BODY MASS INDEX: 28.64 KG/M2 | WEIGHT: 178.2 LBS | HEIGHT: 66 IN | HEART RATE: 70 BPM | DIASTOLIC BLOOD PRESSURE: 79 MMHG | SYSTOLIC BLOOD PRESSURE: 140 MMHG

## 2023-01-10 LAB
T-UPTAKE NFR SERPL: 1.12 TBI (ref 0.8–1.3)
T4 SERPL-MCNC: 7.57 MCG/DL (ref 4.5–11.7)
TSH SERPL DL<=0.05 MIU/L-ACNC: 1.16 UIU/ML (ref 0.27–4.2)

## 2023-01-10 RX ORDER — SEMAGLUTIDE 0.5 MG/.5ML
0.5 INJECTION, SOLUTION SUBCUTANEOUS WEEKLY
Qty: 2 ML | Refills: 0 | Status: SHIPPED | OUTPATIENT
Start: 2023-01-10 | End: 2023-02-13

## 2023-01-10 NOTE — PROGRESS NOTES
Medication Management Clinic  Weight Management Program        Jamia Curtis is a 62 y.o. female referred to the [facility] Management Clinic by Dr. Jarocho Bunch for clinical pharmacy and specialty pharmacy management of Saxenda for weight management.  Jamia Curtis has previously tried walking Weight Watchers, and SlimFast for weight loss.  Current weight loss efforts include lifestyle changes and Saxenda. The patient denies a person history or family history of thyroid cancer and denies a personal history of pancreatitis. Denies swelling/lumps/hoarseness in throat or severe abdominal pain.   Pt reports tolerating Saxenda well other than some nausea once every 2 weeks for about 3 minutes. Patient does have zofran from Dr. Bunch and reported that she rarely has ever taken it. Patient denies any missed doses or trouble giving injection. She does report diet has not been as good with increased sweets through the holiday season.       Relevant Past Medical History and Co-morbidities  No past medical history on file.  Social History     Socioeconomic History   • Marital status:        Allergies  Patient has no known allergies.    Current Medication List    Current Outpatient Medications:   •  celecoxib (CeleBREX) 200 MG capsule, Take 1 capsule by mouth 2 times every day as needed for joint pain., Disp: 180 capsule, Rfl: 3  •  diclofenac (CATAFLAM) 50 MG tablet, Take 1 tablet by mouth 2 times every day as needed for neck pain, Disp: 100 tablet, Rfl: 1  •  Insulin Pen Needle (Pen Needles) 32G X 4 MM misc, 1 each Daily., Disp: 100 each, Rfl: 0  •  ondansetron ODT (ZOFRAN-ODT) 4 MG disintegrating tablet, Place 1 tablet on top of the tongue where it will dissolve then swallow every 8 hours as needed for nausea., Disp: 30 tablet, Rfl: 1  •  pantoprazole (PROTONIX) 40 MG EC tablet, Take 1 tablet by mouth daily., Disp: 90 tablet, Rfl: 3  •  Semaglutide-Weight Management (Wegovy) 0.5 MG/0.5ML solution  auto-injector, Inject 0.5 mg under the skin into the appropriate area as directed 1 (One) Time Per Week., Disp: 2 mL, Rfl: 0  •  traZODone (DESYREL) 50 MG tablet, Take 1 tablet by mouth at bedtime as needed for insomnia., Disp: 90 tablet, Rfl: 1    Drug Interactions  None    Relevant Laboratory Values  Lab Results   Component Value Date    CHOL 180 01/09/2023    CHLPL 194 08/26/2020    TRIG 71 01/09/2023    HDL 50 01/09/2023     (H) 01/09/2023     There is no height or weight on file to calculate BMI.    Vaccinations:   Patient recommended to keep up with routine vaccinations.     Goals of Therapy  • Clinical Goals or Therapeutic Targets: 10% weight loss goal    Date 10/03/22 10/31/22 11/29/22 1/10/23    Weight (lb) 191.3lbs 178.6lbs 178.2 lbs 178.2 lb    BMI kg/ 30.87 28.82 28.76 28.76    Waist Circumference (in)  40.5in 41in 36 in 37\"        Patient Education     Wegovy® (semaglutide)  Medication Expectations   Why am I taking this medication? You are taking Wegovy for weight loss because you have excess weight and also have weight-related medical problems or obesity. It should be used along with a reduced calorie diet and increased physical activity.    What should I expect while on this medication? You should expect to lose at least 5% of your body weight after 3 months of therapy. It can also help keep weight lost off.    How does the medication work? Wegovy is an injection that addresses one of your body's natural responses to weight loss. It works like a naturally produced hormone in the body called GLP-1 that regulates appetite which can lead to eating fewer calories and losing weight. This medication also slows down food from leaving your stomach, making you feel landon for longer.   How long will I be on this medication for? The amount of time you will be on this medication will be determined by your doctor. Do not abruptly stop this medication without talking to your doctor first.    How do I take  this medication? Take as directed on your prescription label. Wegovy is injected under the skin (subcutaneously) of your stomach, thigh, or upper arm. This medication should be taken once weekly and can be given with or without food. Rotate injection sites weekly. If changing the day of administration is necessary, allow at least 48 hours between 2 doses.     After removal of the pen cap, the needles will be hidden inside the needle cover.  To begin injection, press the needle cover firmly against the skin.  Once injected, continue to press the device against the skin until the yellow bar has stopped moving.  Then, remove the needle from the skin.    What are some possible side effects? You may notice you don't feel as hungry, especially when you first start using Wegovy. Some common side effects include nausea, vomiting, diarrhea, vomiting, indigestion, constipation, tiredness, and headache. Redness, itching, and/or swelling can occur where the shot was given. You should also monitor for low blood sugar (hypoglycemia), especially if you are taking Wegovy with other medications that cause low blood sugar. Stop using Wegovy and call your doctor immediately if you have severe pain in your stomach area that will not go away as this could be a sign of pancreatitis (inflammation of your pancreas).     What happens if I miss a dose? If you miss a dose, take it as soon as you remember within 5 days. Resume usual schedule thereafter.  If > 5 days have elapsed, skip the missed dose and resume administration at the next scheduled weekly dose.      Medication Safety   What are things I should warn my doctor immediately about? Do not use Wegovy if you or a family member have ever had medullary thyroid cancer (MTC) or Multiple Endocrine Neoplasia syndrome type 2 (MEN 2). Tell your doctor if you get a lump or swelling in your neck, hoarseness, difficulty swallowing, or feel short of breath (these may be symptoms of thyroid  cancer). Talk to your doctor if you have or have had problems with your pancreas, kidneys, or liver. Tell your doctor if you have problems with digesting food or slowed emptying of your stomach (gastroparesis). Talk to your doctor if you are pregnant, planning to become pregnant, or breastfeeding. Also tell your doctor if you notice any signs/symptoms of an allergic reaction (rash, hives, difficulty breathing, etc.).   What are things that I should be cautious of? Be cautious of any side effects from this medication. Talk to your doctor if any new ones develop or aren't getting better.   What are some medications that can interact with this one? Taking Wegovy with other medications that lower your blood sugar such as insulins and glipizide/glimepiride/glyburide may increase the risk of low blood sugar. It should not be taken with other medicines called GLP-1 receptor agonists, as these work the same way as Wegovy. Because Wegovy slows stomach emptying, it can affect the way some medicines work. Always tell your doctor or pharmacist immediately if you start taking any new medications, including over-the-counter medications, vitamins, and herbal supplements.      Medication Storage/Handling   How should I handle this medication? Keep this medication out of reach of pets/children and keep the pen capped when not in use. Do not share your medicine pens with others.    How does this medication need to be stored? Store your new, unused pens in the original carton in the refrigerator. Protect it from light. Do not freeze. Prior to cap removal, the pen can be kept at room temperature up to 28 days.    How should I dispose of this medication? Used Wegovy pens should be thrown after each use. Place your used pen and needle in an approved sharps container. If you do not have a sharps container, you may use a household container made of heavy-duty plastic with a tight-fitting lid that is leak resistant (e.g., heavy-duty plastic  laundry detergent bottle). If your doctor decides to stop this medication, take to your local police station for proper disposal. Some pharmacies also have take-back bins for medication drop-off.       Resources/Support   How can I remind myself to take this medication? You can download reminder apps to help you manage your refills. You may also set an alarm on your phone to remind you.    Is financial support available?  [employer] can provide co-pay cards if you have commercial insurance.    Which vaccines are recommended for me? Talk to your doctor about these vaccines: Flu, Coronavirus (COVID-19), Pneumococcal (pneumonia), Tdap, Zoster (shingles)        Medication Assessment & Plan    Patient's current BMI is 28.76, which is considered Overweight. Pt has lost 13.1lbs overall. She did not lose or gain any weight this past 4 weeks.  Congratulated her on her success thus far.     Will stop Saxenda and change to  Wegovy 0.5 mg weekly. Shared clinical decision making was used to decide dose of Wegovy to change patient to. She has been tolerating Saxenda well other than some occasional nausea, however, prefers to be more cautious with Wegovy transition as she would rather not feel effects as much than to have more increased risk of side effects. She preferred not to go to the lowest dose so Wegovy 0.5 mg will be this patients initial dose to start at.     The following medications will need dose adjustments/closer monitoring once the GLP1 is started: None     Patient labs reviewed. Will route to Dr. Bunch to discuss Lipid Panel with patient. Patient is also aware to reach out to his office to see if he has any concerns and ask that he review lab work.     Pts BP slightly elevated today. Pt reports this is higher than what it runs at home and she does feel she may have some white coat syndrome. Patient will continue to monitor and is aware if it remains elevated to reach out to Dr. Bunch.     Discussed lifestyle  modifications, including diet and exercise.      Worked with patient to create SMART Goal(s): Pt will continue to try to met these goals  1. Drink 8 cups (2 qts) of water every day  2. Drink zero calories or cutting down from 4 to 1-2 soda cans a day  3. Walking on treadmill 30 min 3x a week    Will follow-up with patient in clinic in 4 weeks.       Vera Gonzalez. Alicia, PharmD  1/10/2023  11:55 EST

## 2023-01-10 NOTE — PROGRESS NOTES
[facility]  Weight Management Program        Jamia Curtis is a 62 y.o. female referred to the [facility] by Dr. Jarocho Bunch for clinical pharmacy and specialty pharmacy management of Saxenda for weight management.  Jamia Curtis has previously tried walking Weight Watchers, and SlimFast for weight loss.  Current weight loss efforts include lifestyle changes and Saxenda. The patient denies a person history or family history of thyroid cancer and denies a personal history of pancreatitis. Denies swelling/lumps/hoarseness in throat or severe abdominal pain.   Pt reports tolerating Saxenda well other than some nausea once every 2 weeks for about 3 minutes. Patient does have zofran from Dr. Bunch and reported that she rarely has ever taken it. Patient denies any missed doses or trouble giving injection. She does report diet has not been as good with increased sweets through the holiday season.       Relevant Past Medical History and Co-morbidities  No past medical history on file.  Social History     Socioeconomic History   • Marital status:        Allergies  Patient has no known allergies.    Current Medication List    Current Outpatient Medications:   •  celecoxib (CeleBREX) 200 MG capsule, Take 1 capsule by mouth 2 times every day as needed for joint pain., Disp: 180 capsule, Rfl: 3  •  diclofenac (CATAFLAM) 50 MG tablet, Take 1 tablet by mouth 2 times every day as needed for neck pain, Disp: 100 tablet, Rfl: 1  •  Insulin Pen Needle (Pen Needles) 32G X 4 MM misc, 1 each Daily., Disp: 100 each, Rfl: 0  •  Liraglutide (SAXENDA) 18 MG/3ML injection pen, Inject 3 mg under the skin into the appropriate area as directed Daily., Disp: 15 mL, Rfl: 0  •  ondansetron ODT (ZOFRAN-ODT) 4 MG disintegrating tablet, Place 1 tablet on top of the tongue where it will dissolve then swallow every 8 hours as needed for nausea., Disp: 30 tablet, Rfl: 1  •  pantoprazole  (PROTONIX) 40 MG EC tablet, Take 1 tablet by mouth daily., Disp: 90 tablet, Rfl: 3  •  traZODone (DESYREL) 50 MG tablet, Take 1 tablet by mouth at bedtime as needed for insomnia., Disp: 90 tablet, Rfl: 1    Drug Interactions  None    Relevant Laboratory Values  Lab Results   Component Value Date    CHOL 180 01/09/2023    CHLPL 194 08/26/2020    TRIG 71 01/09/2023    HDL 50 01/09/2023     (H) 01/09/2023     There is no height or weight on file to calculate BMI.    Vaccinations:   Patient recommended to keep up with routine vaccinations.     Goals of Therapy  • Clinical Goals or Therapeutic Targets: 10% weight loss goal    Date 10/03/22 10/31/22 11/29/22 1/10/23    Weight (lb) 191.3lbs 178.6lbs 178.2 lbs 178.2 lb    BMI kg/ 30.87 28.82 28.76 28.76    Waist Circumference (in)  40.5in 41in 36 in 37\"        Patient Education    Wegovy® (semaglutide)  Medication Expectations   Why am I taking this medication? You are taking Wegovy for weight loss because you have excess weight and also have weight-related medical problems or obesity. It should be used along with a reduced calorie diet and increased physical activity.    What should I expect while on this medication? You should expect to lose at least 5% of your body weight after 3 months of therapy. It can also help keep weight lost off.    How does the medication work? Wegovy is an injection that addresses one of your body's natural responses to weight loss. It works like a naturally produced hormone in the body called GLP-1 that regulates appetite which can lead to eating fewer calories and losing weight. This medication also slows down food from leaving your stomach, making you feel landon for longer.   How long will I be on this medication for? The amount of time you will be on this medication will be determined by your doctor. Do not abruptly stop this medication without talking to your doctor first.    How do I take this medication? Take as directed on your  prescription label. Wegovy is injected under the skin (subcutaneously) of your stomach, thigh, or upper arm. This medication should be taken once weekly and can be given with or without food. Rotate injection sites weekly. If changing the day of administration is necessary, allow at least 48 hours between 2 doses.     After removal of the pen cap, the needles will be hidden inside the needle cover.  To begin injection, press the needle cover firmly against the skin.  Once injected, continue to press the device against the skin until the yellow bar has stopped moving.  Then, remove the needle from the skin.    What are some possible side effects? You may notice you don't feel as hungry, especially when you first start using Wegovy. Some common side effects include nausea, vomiting, diarrhea, vomiting, indigestion, constipation, tiredness, and headache. Redness, itching, and/or swelling can occur where the shot was given. You should also monitor for low blood sugar (hypoglycemia), especially if you are taking Wegovy with other medications that cause low blood sugar. Stop using Wegovy and call your doctor immediately if you have severe pain in your stomach area that will not go away as this could be a sign of pancreatitis (inflammation of your pancreas).     What happens if I miss a dose? If you miss a dose, take it as soon as you remember within 5 days. Resume usual schedule thereafter.  If > 5 days have elapsed, skip the missed dose and resume administration at the next scheduled weekly dose.      Medication Safety   What are things I should warn my doctor immediately about? Do not use Wegovy if you or a family member have ever had medullary thyroid cancer (MTC) or Multiple Endocrine Neoplasia syndrome type 2 (MEN 2). Tell your doctor if you get a lump or swelling in your neck, hoarseness, difficulty swallowing, or feel short of breath (these may be symptoms of thyroid cancer). Talk to your doctor if you have or have  had problems with your pancreas, kidneys, or liver. Tell your doctor if you have problems with digesting food or slowed emptying of your stomach (gastroparesis). Talk to your doctor if you are pregnant, planning to become pregnant, or breastfeeding. Also tell your doctor if you notice any signs/symptoms of an allergic reaction (rash, hives, difficulty breathing, etc.).   What are things that I should be cautious of? Be cautious of any side effects from this medication. Talk to your doctor if any new ones develop or aren't getting better.   What are some medications that can interact with this one? Taking Wegovy with other medications that lower your blood sugar such as insulins and glipizide/glimepiride/glyburide may increase the risk of low blood sugar. It should not be taken with other medicines called GLP-1 receptor agonists, as these work the same way as Wegovy. Because Wegovy slows stomach emptying, it can affect the way some medicines work. Always tell your doctor or pharmacist immediately if you start taking any new medications, including over-the-counter medications, vitamins, and herbal supplements.      Medication Storage/Handling   How should I handle this medication? Keep this medication out of reach of pets/children and keep the pen capped when not in use. Do not share your medicine pens with others.    How does this medication need to be stored? Store your new, unused pens in the original carton in the refrigerator. Protect it from light. Do not freeze. Prior to cap removal, the pen can be kept at room temperature up to 28 days.    How should I dispose of this medication? Used Wegovy pens should be thrown after each use. Place your used pen and needle in an approved sharps container. If you do not have a sharps container, you may use a household container made of heavy-duty plastic with a tight-fitting lid that is leak resistant (e.g., heavy-duty plastic laundry detergent bottle). If your doctor  decides to stop this medication, take to your local police station for proper disposal. Some pharmacies also have take-back bins for medication drop-off.       Resources/Support   How can I remind myself to take this medication? You can download reminder apps to help you manage your refills. You may also set an alarm on your phone to remind you.    Is financial support available?  [email or web address] can provide co-pay cards if you have commercial insurance.    Which vaccines are recommended for me? Talk to your doctor about these vaccines: Flu, Coronavirus (COVID-19), Pneumococcal (pneumonia), Tdap, Zoster (shingles)        Medication Assessment & Plan    Patient's current BMI is 28.76, which is considered Overweight. Pt has lost 13.1lbs overall. She did not lose or gain any weight this past 4 weeks.  Congratulated her on her success thus far.     Will stop Saxenda and change to  Wegovy 0.5 mg weekly. Shared clinical decision making was used to decide dose of Wegovy to change patient to. She has been tolerating Saxenda well other than some occasional nausea, however, prefers to be more cautious with Wegovy transition as she would rather not feel effects as much than to have more increased risk of side effects. She preferred not to go to the lowest dose so Wegovy 0.5 mg will be this patients initial dose to start at.     The following medications will need dose adjustments/closer monitoring once the GLP1 is started: None     Patient labs reviewed. Will route to Dr. Bunch to discuss Lipid Panel with patient. Patient is also aware to reach out to his office to see if he has any concerns and ask that he review lab work.     Pts BP slightly elevated today. Pt reports this is higher than what it runs at home and she does feel she may have some white coat syndrome. Patient will continue to monitor and is aware if it remains elevated to reach out to Dr. Bunch.     Discussed lifestyle modifications, including diet and exercise.       Worked with patient to create SMART Goal(s): Pt will continue to try to met these goals  1. Drink 8 cups (2 qts) of water every day  2. Drink zero calories or cutting down from 4 to 1-2 soda cans a day  3. Walking on treadmill 30 min 3x a week    Will follow-up with patient in clinic in 4 weeks.       Vera Gonzalez. Ailcia, PharmD  1/10/2023  10:25 EST

## 2023-01-31 ENCOUNTER — SPECIALTY PHARMACY (OUTPATIENT)
Dept: PHARMACY | Facility: HOSPITAL | Age: 63
End: 2023-01-31
Payer: COMMERCIAL

## 2023-02-13 ENCOUNTER — DISEASE STATE MANAGEMENT VISIT (OUTPATIENT)
Dept: PHARMACY | Facility: HOSPITAL | Age: 63
End: 2023-02-13
Payer: COMMERCIAL

## 2023-02-13 VITALS
DIASTOLIC BLOOD PRESSURE: 78 MMHG | BODY MASS INDEX: 28.28 KG/M2 | WEIGHT: 176 LBS | SYSTOLIC BLOOD PRESSURE: 136 MMHG | HEIGHT: 66 IN | HEART RATE: 72 BPM

## 2023-02-13 RX ORDER — SEMAGLUTIDE 1 MG/.5ML
1 INJECTION, SOLUTION SUBCUTANEOUS WEEKLY
Qty: 2 ML | Refills: 0 | Status: SHIPPED | OUTPATIENT
Start: 2023-02-13 | End: 2023-03-02 | Stop reason: SDUPTHER

## 2023-02-13 NOTE — PROGRESS NOTES
[facility]  Weight Management Program        Jamia Curtis is a 63 y.o. female referred to the [facility] by Dr. Jarocho Bunch for clinical pharmacy and specialty pharmacy management of [facility] for weight management.  Jamia Curtis has previously tried walking Weight Watchers, Slim Fast, and Saxenda for weight loss.  Current weight loss efforts include lifestyle changes and Wegovy. The patient denies a person history or family history of thyroid cancer and denies a personal history of pancreatitis. Denies swelling/lumps/hoarseness in throat or severe abdominal pain.     Pt switched from Saxenda to Wegovy on last visit. Pt reports tolerating Wegovy well and denies any side effects since starting. Patient denies any missed doses or trouble giving injection. Patient reported she did not feel appetite suppression was good on the 0.5 mg dose she has been on and actually thought she had gained weight so was pleased with loss this month.     Relevant Past Medical History and Co-morbidities  No past medical history on file.  Social History     Socioeconomic History   • Marital status:        Allergies  Patient has no known allergies.    Current Medication List    Current Outpatient Medications:   •  celecoxib (CeleBREX) 200 MG capsule, Take 1 capsule by mouth 2 times every day as needed for joint pain., Disp: 180 capsule, Rfl: 3  •  diclofenac (CATAFLAM) 50 MG tablet, Take 1 tablet by mouth 2 times every day as needed for neck pain, Disp: 100 tablet, Rfl: 1  •  Insulin Pen Needle (Pen Needles) 32G X 4 MM misc, 1 each Daily., Disp: 100 each, Rfl: 0  •  ondansetron ODT (ZOFRAN-ODT) 4 MG disintegrating tablet, Place 1 tablet on top of the tongue where it will dissolve then swallow every 8 hours as needed for nausea., Disp: 30 tablet, Rfl: 1  •  pantoprazole (PROTONIX) 40 MG EC tablet, Take 1 tablet by mouth daily., Disp: 90 tablet, Rfl: 3  •  Semaglutide-Weight  "Management (Wegovy) 0.5 MG/0.5ML solution auto-injector, Inject 0.5 mg under the skin into the appropriate area as directed 1 (One) Time Per Week., Disp: 2 mL, Rfl: 0  •  traZODone (DESYREL) 50 MG tablet, Take 1 tablet by mouth at bedtime as needed for insomnia., Disp: 90 tablet, Rfl: 1    Drug Interactions  None    Relevant Laboratory Values  Lab Results   Component Value Date    CHOL 180 01/09/2023    CHLPL 194 08/26/2020    TRIG 71 01/09/2023    HDL 50 01/09/2023     (H) 01/09/2023     There is no height or weight on file to calculate BMI.    Vaccinations:   Patient recommended to keep up with routine vaccinations.     Goals of Therapy  • Clinical Goals or Therapeutic Targets: 10% weight loss goal    Date 10/03/22 10/31/22 11/29/22 1/10/23 2/13/23   Weight (lb) 191.3lbs 178.6lbs 178.2 lbs 178.2 lb 176 lb   BMI kg/ 30.87 28.82 28.76 28.76 28.41   Waist Circumference (in)  40.5in 41in 36 in 37\" 36.5\"  * changed over to wegovy last visit           Medication Assessment & Plan    Patient's current BMI is 28.41, which is considered Overweight. Pt has lost 15.3 lbs overall.  Congratulated her on her success thus far.     Will order Wegovy 1 mg weekly.     The following medications will need dose adjustments/closer monitoring once the GLP1 is started: None     Patient labs reviewed. Will route to Dr. Bunch to discuss Lipid Panel with patient. Patient is also aware to reach out to his office to see if he has any concerns and ask that he review lab work.     Discussed lifestyle modifications, including diet and exercise.      Worked with patient to create SMART Goal(s): Pt will continue to try to met these goals  1. Drink 8 cups (2 qts) of water every day  2. Drink zero calories or cutting down from 4 to 1-2 soda cans a day  3. Walking on treadmill 30 min 3x a week    Will follow-up with patient in clinic in 4 weeks.       Vera Gonzalez. Alicia, PharmD  2/13/2023  07:42 EST  "

## 2023-03-02 ENCOUNTER — DISEASE STATE MANAGEMENT VISIT (OUTPATIENT)
Dept: PHARMACY | Facility: HOSPITAL | Age: 63
End: 2023-03-02
Payer: COMMERCIAL

## 2023-03-02 VITALS
BODY MASS INDEX: 27.8 KG/M2 | SYSTOLIC BLOOD PRESSURE: 127 MMHG | HEART RATE: 71 BPM | WEIGHT: 173 LBS | HEIGHT: 66 IN | DIASTOLIC BLOOD PRESSURE: 77 MMHG

## 2023-03-02 RX ORDER — SEMAGLUTIDE 1 MG/.5ML
1 INJECTION, SOLUTION SUBCUTANEOUS WEEKLY
Qty: 2 ML | Refills: 0 | Status: SHIPPED | OUTPATIENT
Start: 2023-03-02

## 2023-03-02 NOTE — PROGRESS NOTES
Medication Management Clinic  Weight Management Program        Jamia Curtis is a 63 y.o. female referred to the [facility] Management Clinic by Dr. Jarocho Bunch for clinical pharmacy and specialty pharmacy management of [facility] for weight management.  Jamia Curtis has previously tried walking Weight Watchers, Slim Fast, and Saxenda for weight loss.  Current weight loss efforts include lifestyle changes and Wegovy. The patient denies a person history or family history of thyroid cancer and denies a personal history of pancreatitis. Denies swelling/lumps/hoarseness in throat or severe abdominal pain.     Pt reports some nausea, and two instances of vomiting, one occasion which she vomited once and another occasion which she vomited multiple times. Reports that instance of prolonged vomiting was on a day she was in the heat at a ball game in [place], and that she may have been dehydrated that day due to not drinking a lot of water. Patient denied abdominal pain. Patient reports she feels good most days on the Wegovy but is worried about if she continues to have vomiting episodes. Suspect this episode was due to combination of medication + heat + dehydration. Counseled patient on need to stay hydrated, especially when in the heat, and to monitor herself and update us on any further vomiting. Patient denies any missed doses or trouble giving injection.     Relevant Past Medical History and Co-morbidities  No past medical history on file.  Social History     Socioeconomic History   • Marital status:        Allergies  Patient has no known allergies.    Current Medication List    Current Outpatient Medications:   •  celecoxib (CeleBREX) 200 MG capsule, Take 1 capsule by mouth 2 times every day as needed for joint pain., Disp: 180 capsule, Rfl: 3  •  diclofenac (CATAFLAM) 50 MG tablet, Take 1 tablet by mouth 2 times every day as needed for neck pain, Disp: 100 tablet, Rfl: 1  •  Insulin Pen Needle  "(Pen Needles) 32G X 4 MM misc, 1 each Daily., Disp: 100 each, Rfl: 0  •  ondansetron ODT (ZOFRAN-ODT) 4 MG disintegrating tablet, Place 1 tablet on top of the tongue where it will dissolve then swallow every 8 hours as needed for nausea., Disp: 30 tablet, Rfl: 1  •  pantoprazole (PROTONIX) 40 MG EC tablet, Take 1 tablet by mouth daily., Disp: 90 tablet, Rfl: 3  •  Semaglutide-Weight Management (Wegovy) 1 MG/0.5ML solution auto-injector, Inject 1 mg under the skin into the appropriate area as directed 1 (One) Time Per Week., Disp: 2 mL, Rfl: 0  •  traZODone (DESYREL) 50 MG tablet, Take 1 tablet by mouth at bedtime as needed for insomnia., Disp: 90 tablet, Rfl: 1    Drug Interactions  None    Relevant Laboratory Values  Lab Results   Component Value Date    CHOL 180 01/09/2023    CHLPL 194 08/26/2020    TRIG 71 01/09/2023    HDL 50 01/09/2023     (H) 01/09/2023     There is no height or weight on file to calculate BMI.    Vaccinations:   Patient recommended to keep up with routine vaccinations.     Goals of Therapy  • Clinical Goals or Therapeutic Targets: 10% weight loss goal    Date 10/03/22 10/31/22 11/29/22 1/10/23 2/13/23 3/2/23   Weight (lb) 191.3lbs 178.6lbs 178.2 lbs 178.2 lb 176 lb 173 lb   BMI kg/ 30.87 28.82 28.76 28.76 28.41 27.92   Waist Circumference (in)  40.5in 41in 36 in 37\" 36.5\"  * changed over to wegovy last visit 36\"           Medication Assessment & Plan    Patient's current BMI is 28.41, which is considered Overweight. Pt has lost 18.3 lbs and 4.5\" off her waist overall.  Congratulated her on her success thus far.     Will order Wegovy 1 mg weekly. Discussed with patient decision to continue current dose for next month before titrating due to instance of severe vomiting and upcoming trip to [place] where she will be in the heat often.    The following medications will need dose adjustments/closer monitoring once the GLP1 is started: None    Discussed lifestyle modifications, including " diet and exercise.      Worked with patient to create SMART Goal(s): Pt will continue to try to met these goals  1. Drink 8 cups (2 qts) of water every day  2. Drink zero calories or cutting down from 4 to 1-2 soda cans a day  3. Walking on treadmill 30 min 3x a week    Will follow-up with patient in clinic in 4 weeks.       James Rodas RPH  3/2/2023  08:08 EST

## 2023-04-18 ENCOUNTER — DISEASE STATE MANAGEMENT VISIT (OUTPATIENT)
Dept: PHARMACY | Facility: HOSPITAL | Age: 63
End: 2023-04-18
Payer: COMMERCIAL

## 2023-04-18 VITALS
HEIGHT: 66 IN | HEART RATE: 73 BPM | DIASTOLIC BLOOD PRESSURE: 71 MMHG | WEIGHT: 175.6 LBS | SYSTOLIC BLOOD PRESSURE: 126 MMHG | BODY MASS INDEX: 28.22 KG/M2

## 2023-04-18 RX ORDER — SEMAGLUTIDE 1.7 MG/.75ML
1.7 INJECTION, SOLUTION SUBCUTANEOUS WEEKLY
Qty: 3 ML | Refills: 0 | Status: SHIPPED | OUTPATIENT
Start: 2023-04-18

## 2023-04-18 NOTE — PROGRESS NOTES
Medication Management Clinic  Weight Management Program        Jamia Curtis is a 63 y.o. female referred to the Medication Management Clinic by Dr. Jarocho Bunch for clinical pharmacy and specialty pharmacy management of [facility] for weight management.  Jamia Curtis has previously tried walking Weight Watchers, Slim Fast, and Saxenda for weight loss.  Current weight loss efforts include lifestyle changes and Wegovy. The patient denies a person history or family history of thyroid cancer and denies a personal history of pancreatitis. Denies swelling/lumps/hoarseness in throat or severe abdominal pain.     Pt is currently on Wegovy 1 mg. Patient denies any missed doses or trouble giving injection. Pt reports that the N/V that she recently reported has improved with another 4 weeks continued on the 1 mg dose. She reports she may have had one episode of nausea but she can't remember for sure if that was even in the past 4 weeks. Patient does, however, feel that the 1 mg has not been suppressing appetite. Patient was considering switching back to Saxenda, but has decided at this time to pursue dose titration of Wegovy this time and if she makes a decision at a later visit to change back to Saxenda will let the clinic know.     Relevant Past Medical History and Co-morbidities  No past medical history on file.  Social History     Socioeconomic History   • Marital status:        Allergies  Patient has no known allergies.    Current Medication List    Current Outpatient Medications:   •  celecoxib (CeleBREX) 200 MG capsule, Take 1 capsule by mouth 2 times every day as needed for joint pain., Disp: 180 capsule, Rfl: 3  •  diclofenac (CATAFLAM) 50 MG tablet, Take 1 tablet by mouth 2 times every day as needed for neck pain, Disp: 100 tablet, Rfl: 1  •  Insulin Pen Needle (Pen Needles) 32G X 4 MM misc, 1 each Daily., Disp: 100 each, Rfl: 0  •  ondansetron ODT (ZOFRAN-ODT) 4 MG disintegrating tablet,  "Place 1 tablet on top of the tongue where it will dissolve then swallow every 8 hours as needed for nausea., Disp: 30 tablet, Rfl: 1  •  pantoprazole (PROTONIX) 40 MG EC tablet, Take 1 tablet by mouth daily., Disp: 90 tablet, Rfl: 3  •  Semaglutide-Weight Management (Wegovy) 1 MG/0.5ML solution auto-injector, Inject 1 mg under the skin into the appropriate area as directed 1 (One) Time Per Week., Disp: 2 mL, Rfl: 0  •  traZODone (DESYREL) 50 MG tablet, Take 1 tablet by mouth at bedtime as needed for insomnia., Disp: 90 tablet, Rfl: 1    Drug Interactions  None    Relevant Laboratory Values  Lab Results   Component Value Date    CHOL 180 01/09/2023    CHLPL 194 08/26/2020    TRIG 71 01/09/2023    HDL 50 01/09/2023     (H) 01/09/2023     There is no height or weight on file to calculate BMI.    Vaccinations:   Patient recommended to keep up with routine vaccinations.     Goals of Therapy  • Clinical Goals or Therapeutic Targets: 10% weight loss goal    Date 10/03/22 10/31/22 11/29/22 1/10/23 2/13/23 3/2/23 4/18/23   Weight (lb) 191.3lbs 178.6lbs 178.2 lbs 178.2 lb 176 lb 173 lb 175.6 lbs   BMI kg/ 30.87 28.82 28.76 28.76 28.41 27.92 28.34   Waist Circumference (in)  40.5in 41in 36 in 37\" 36.5\"  * changed over to wegovy last visit 36\" 36.5\"           Medication Assessment & Plan    Patient's current BMI is 28.41, which is considered Overweight. Pt has lost 18.3 lbs and 4.5\" off her waist overall.  Congratulated her on her success thus far. Patient did not have weight loss this visit. She did remain on the 1 mg dose for a 2nd consecutive month to help with tolerance and reported she did not feel appetite suppression with it.     Will order Wegovy 1.7 mg weekly. Pt may decide to change back to Saxenda in the future.     The following medications will need dose adjustments/closer monitoring once the GLP1 is started: None    Discussed lifestyle modifications, including diet and exercise.      Worked with patient to " create SMART Goal(s): Pt will continue to try to met these goals  1. Drink 8 cups (2 qts) of water every day  2. Drink zero calories or cutting down from 4 to 1-2 soda cans a day  3. Walking on treadmill 30 min 3x a week    Will follow-up with patient in clinic in 4 weeks.       Vera Gonzalez. Alicia, PharmD  4/18/2023  08:06 EDT

## 2023-05-10 ENCOUNTER — TRANSCRIBE ORDERS (OUTPATIENT)
Dept: ADMINISTRATIVE | Facility: HOSPITAL | Age: 63
End: 2023-05-10
Payer: COMMERCIAL

## 2023-05-10 ENCOUNTER — LAB (OUTPATIENT)
Dept: LAB | Facility: HOSPITAL | Age: 63
End: 2023-05-10
Payer: COMMERCIAL

## 2023-05-10 DIAGNOSIS — J06.9 ACUTE UPPER RESPIRATORY INFECTION: Primary | ICD-10-CM

## 2023-05-10 DIAGNOSIS — J06.9 ACUTE UPPER RESPIRATORY INFECTION: ICD-10-CM

## 2023-05-10 PROCEDURE — C9803 HOPD COVID-19 SPEC COLLECT: HCPCS

## 2023-05-10 PROCEDURE — 0202U NFCT DS 22 TRGT SARS-COV-2: CPT

## 2023-05-19 ENCOUNTER — DISEASE STATE MANAGEMENT VISIT (OUTPATIENT)
Dept: PHARMACY | Facility: HOSPITAL | Age: 63
End: 2023-05-19
Payer: COMMERCIAL

## 2023-05-19 VITALS
HEART RATE: 69 BPM | WEIGHT: 173.6 LBS | DIASTOLIC BLOOD PRESSURE: 78 MMHG | SYSTOLIC BLOOD PRESSURE: 137 MMHG | BODY MASS INDEX: 28.02 KG/M2

## 2023-05-19 RX ORDER — SEMAGLUTIDE 2.4 MG/.75ML
2.4 INJECTION, SOLUTION SUBCUTANEOUS WEEKLY
Qty: 3 ML | Refills: 0 | Status: SHIPPED | OUTPATIENT
Start: 2023-05-19

## 2023-05-19 NOTE — PROGRESS NOTES
[facility]  Weight Management Program        Jamia Curtis is a 63 y.o. female referred to the [facility] by Dr. Jarocho Bunch for clinical pharmacy and specialty pharmacy management of Saxenda for weight management.  Jamia Curtis has previously tried walking Weight Watchers, Slim Fast, and Saxenda for weight loss.  Patient tolerated Saxenda with no issues, switched to Wegovy due to shortage. Current weight loss efforts include lifestyle changes and Wegovy. The patient denies a person history or family history of thyroid cancer and denies a personal history of pancreatitis. Denies swelling/lumps/hoarseness in throat or severe abdominal pain.     Pt is currently on Wegovy 1.7 mg and doesn't feel like it's working and doesn't notice appetite suppression as much as Saxenda. Patient denies any missed doses or trouble giving injection. Patient was considering switching back to Saxenda, but has decided at this time to pursue dose titration of Wegovy and if she makes a decision at a later visit to change back to Saxenda will let the clinic know. She continues to make progress toward her goals and would like to continue them.    Relevant Past Medical History and Co-morbidities  No past medical history on file.  Social History     Socioeconomic History   • Marital status:        Allergies  Patient has no known allergies.    Current Medication List    Current Outpatient Medications:   •  azithromycin (ZITHROMAX) 500 MG tablet, Take 1 tablet by mouth every day for 3 days., Disp: 3 tablet, Rfl: 0  •  diclofenac (CATAFLAM) 50 MG tablet, Take 1 tablet by mouth 2 times every day as needed for neck pain, Disp: 100 tablet, Rfl: 1  •  diclofenac (CATAFLAM) 50 MG tablet, Take 1 tablet by mouth 2 times every day as needed for neck pain, Disp: 100 tablet, Rfl: 1  •  Insulin Pen Needle (Pen Needles) 32G X 4 MM misc, 1 each Daily., Disp: 100 each, Rfl: 0  •  ondansetron ODT  "(ZOFRAN-ODT) 4 MG disintegrating tablet, Place 1 tablet on top of the tongue where it will dissolve then swallow every 8 hours as needed for nausea., Disp: 30 tablet, Rfl: 1  •  ondansetron ODT (ZOFRAN-ODT) 4 MG disintegrating tablet, Dissolve 1 tablet on tongue then swallow every 8 hours as needed for nausea, Disp: 30 tablet, Rfl: 1  •  pantoprazole (PROTONIX) 40 MG EC tablet, Take 1 tablet by mouth daily., Disp: 90 tablet, Rfl: 3  •  pantoprazole (PROTONIX) 40 MG EC tablet, Take 1 tablet by mouth Daily., Disp: 90 tablet, Rfl: 3  •  Semaglutide-Weight Management (Wegovy) 1.7 MG/0.75ML solution auto-injector, Inject 0.75 mL under the skin into the appropriate area as directed 1 (One) Time Per Week., Disp: 3 mL, Rfl: 0  •  traZODone (DESYREL) 50 MG tablet, Take 1 tablet by mouth at bedtime as needed for insomnia., Disp: 90 tablet, Rfl: 1  •  traZODone (DESYREL) 50 MG tablet, Take 1 tablet by mouth at bedtime as needed for insomnia., Disp: 90 tablet, Rfl: 1    Drug Interactions  None    Relevant Laboratory Values  Lab Results   Component Value Date    CHOL 180 01/09/2023    CHLPL 194 08/26/2020    TRIG 71 01/09/2023    HDL 50 01/09/2023     (H) 01/09/2023     There is no height or weight on file to calculate BMI.    Vaccinations:   Patient recommended to keep up with routine vaccinations.     Goals of Therapy  • Clinical Goals or Therapeutic Targets: 10% weight loss goal    Date 10/03/22 10/31/22 11/29/22 1/10/23 2/13/23 3/2/23 4/18/23 5/19/23    Weight (lb) 191.3lbs 178.6lbs 178.2 lbs 178.2 lb 176 lb 173 lb 175.6 lbs 173.6 lbs    BMI kg/ 30.87 28.82 28.76 28.76 28.41 27.92 28.34 28.02    Waist Circumference (in)  40.5in 41in 36 in 37\" 36.5\"  * changed over to wegovy last visit 36\" 36.5\" 37\"          Medication Assessment & Plan    Patient's current BMI is 28.41, which is considered Overweight. Pt has lost 17.7 lbs and 3.5\" off her waist overall.  Congratulated her on her success thus far.    Will discontinue " Wegovy 1.7 mg and order Wegovy 2.4 mg weekly. Pt may decide to change back to Saxenda in the future, but wants to try 2.4 Wegovy and see if she experiences better appetite suppression.     The following medications will need dose adjustments/closer monitoring once the GLP1 is started: None    Discussed lifestyle modifications, including diet and exercise.       Worked with patient to create SMART Goal(s): Pt will continue to try to met these goals  1. Drink 8 cups (2 qts) of water every day  2. Drink zero calories or cutting down from 4 to 1-2 soda cans a day  3. Walking outside 30 min 3x a week    Will follow-up with patient in clinic in 4 weeks.       Cher Michael [facility]  5/19/2023  07:47 EDT

## 2023-06-15 ENCOUNTER — DISEASE STATE MANAGEMENT VISIT (OUTPATIENT)
Dept: PHARMACY | Facility: HOSPITAL | Age: 63
End: 2023-06-15
Payer: COMMERCIAL

## 2023-06-15 VITALS
HEART RATE: 66 BPM | DIASTOLIC BLOOD PRESSURE: 73 MMHG | HEIGHT: 66 IN | WEIGHT: 170.8 LBS | BODY MASS INDEX: 27.45 KG/M2 | SYSTOLIC BLOOD PRESSURE: 138 MMHG

## 2023-06-15 RX ORDER — SEMAGLUTIDE 2.4 MG/.75ML
2.4 INJECTION, SOLUTION SUBCUTANEOUS WEEKLY
Qty: 3 ML | Refills: 0 | Status: SHIPPED | OUTPATIENT
Start: 2023-06-15

## 2023-06-15 NOTE — PROGRESS NOTES
[facility]  Weight Management Program        Jamia Curtis is a 63 y.o. female referred to the [facility] by Dr. Jarocho Bunch for clinical pharmacy and [facility].  Jamia Curtis has previously tried walking Weight Watchers, Slim Fast, and Saxenda for weight loss.  Patient tolerated Saxenda with no issues, switched to Wegovy due to shortage. Current weight loss efforts include lifestyle changes and Wegovy. The patient denies a person history or family history of thyroid cancer and denies a personal history of pancreatitis.    Patient is currently on Wegovy 2.4 mg weekly. Patient denies any lumps/swelling/hoarseness in neck/throat or abdominal pain. Patient reports slight nausea and that she took a Zofran and it went away. Patient denies any trouble giving injection or any missed doses. Patient states that she is doing better with her SMART goals. She is drinking more water and has been walking. She has cut down her soda intake to one can a day.    Relevant Past Medical History and Co-morbidities  No past medical history on file.  Social History     Socioeconomic History    Marital status:        Allergies  Patient has no known allergies.    Current Medication List    Current Outpatient Medications:     diclofenac (CATAFLAM) 50 MG tablet, Take 1 tablet by mouth 2 times every day as needed for neck pain, Disp: 100 tablet, Rfl: 1    diclofenac (CATAFLAM) 50 MG tablet, Take 1 tablet by mouth 2 times every day as needed for neck pain, Disp: 100 tablet, Rfl: 1    Insulin Pen Needle (Pen Needles) 32G X 4 MM misc, 1 each Daily., Disp: 100 each, Rfl: 0    ondansetron ODT (ZOFRAN-ODT) 4 MG disintegrating tablet, Place 1 tablet on top of the tongue where it will dissolve then swallow every 8 hours as needed for nausea., Disp: 30 tablet, Rfl: 1    ondansetron ODT (ZOFRAN-ODT) 4 MG disintegrating tablet, Dissolve 1  "tablet on tongue then swallow every 8 hours as needed for nausea, Disp: 30 tablet, Rfl: 1    pantoprazole (PROTONIX) 40 MG EC tablet, Take 1 tablet by mouth daily., Disp: 90 tablet, Rfl: 3    pantoprazole (PROTONIX) 40 MG EC tablet, Take 1 tablet by mouth Daily., Disp: 90 tablet, Rfl: 3    Semaglutide-Weight Management (Wegovy) 2.4 MG/0.75ML solution auto-injector, Inject 2.4 mg under the skin into the appropriate area as directed 1 (One) Time Per Week., Disp: 3 mL, Rfl: 0    traZODone (DESYREL) 50 MG tablet, Take 1 tablet by mouth at bedtime as needed for insomnia., Disp: 90 tablet, Rfl: 1    traZODone (DESYREL) 50 MG tablet, Take 1 tablet by mouth at bedtime as needed for insomnia., Disp: 90 tablet, Rfl: 1    Drug Interactions  None    Relevant Laboratory Values  Lab Results   Component Value Date    CHOL 180 01/09/2023    CHLPL 194 08/26/2020    TRIG 71 01/09/2023    HDL 50 01/09/2023     (H) 01/09/2023     There is no height or weight on file to calculate BMI.    Vaccinations:   Patient recommended to keep up with routine vaccinations.     Goals of Therapy  Clinical Goals or Therapeutic Targets: 10% weight loss goal    Date 10/03/22 10/31/22 11/29/22 1/10/23 2/13/23 3/2/23 4/18/23 5/19/23 6/15/23   Weight (lb) 191.3lbs 178.6lbs 178.2 lbs 178.2 lb 176 lb 173 lb 175.6 lbs 173.6 lbs 170.8 lb   BMI kg/ 30.87 28.82 28.76 28.76 28.41 27.92 28.34 28.02 27.57   Waist Circumference (in)  40.5in 41in 36 in 37\" 36.5\"  * changed over to wegovy last visit 36\" 36.5\" 37\" 38\"         Medication Assessment & Plan    Patient's current BMI is 27.57, which is considered Overweight. Pt has lost 20.5 lbs overall.  Congratulated her on her success thus far.    Will order Wegovy 2.4 mg weekly.      The following medications will need dose adjustments/closer monitoring once the GLP1 is started: None    Discussed lifestyle modifications, including diet and exercise.       Worked with patient to create SMART Goal(s):   1. Drink 8 " cups (2 qts) of water every day  2. Drink 1 can of soda a day  3. Walking outside 30 min 3x a week    Will follow-up with patient in clinic in 4 weeks.       Carolina Max, PharmD  6/15/2023  08:04 EDT

## 2023-08-14 ENCOUNTER — DISEASE STATE MANAGEMENT VISIT (OUTPATIENT)
Dept: PHARMACY | Facility: HOSPITAL | Age: 63
End: 2023-08-14
Payer: COMMERCIAL

## 2023-08-14 VITALS
SYSTOLIC BLOOD PRESSURE: 128 MMHG | HEART RATE: 70 BPM | BODY MASS INDEX: 26.68 KG/M2 | DIASTOLIC BLOOD PRESSURE: 58 MMHG | HEIGHT: 66 IN | WEIGHT: 166 LBS

## 2023-08-14 RX ORDER — SEMAGLUTIDE 2.4 MG/.75ML
2.4 INJECTION, SOLUTION SUBCUTANEOUS WEEKLY
Qty: 3 ML | Refills: 0 | Status: SHIPPED | OUTPATIENT
Start: 2023-08-14

## 2023-08-14 NOTE — PROGRESS NOTES
[facility] Management Clinic  Weight Management Program        Jamia Curtis is a 63 y.o. female referred to the [facility] Management Clinic by Dr. Jarocho Bunch for clinical pharmacy and [facility].  Jamia Curtis has previously tried walking Weight Watchers, Slim Fast, and Saxenda for weight loss.  Patient tolerated Saxenda with no issues, switched to Wegovy due to shortage. Current weight loss efforts include lifestyle changes and Wegovy. The patient denies a person history or family history of thyroid cancer and denies a personal history of pancreatitis.    Patient is currently on Wegovy 2.4 mg weekly. Patient denies any lumps/swelling/hoarseness in neck/throat or abdominal pain. Patient reports tolerating medication well without any side effects. Patient denies any trouble giving injection. She reports that she did miss a dose this month because she forgot to take it prior to leaving for vacation. Patient reports that she has been consistently meeting her water intake goal and limiting soda to only one can a day. She reports that sometimes she doesn't have any soda. She states that that she usually walks 3x a week but that it's been hard lately because she has bursitis in her hip. She would like to continue targeting these goals for now.      Relevant Past Medical History and Co-morbidities  No past medical history on file.  Social History     Socioeconomic History    Marital status:        Allergies  Patient has no known allergies.    Current Medication List    Current Outpatient Medications:     diclofenac (CATAFLAM) 50 MG tablet, Take 1 tablet by mouth 2 times every day as needed for neck pain, Disp: 100 tablet, Rfl: 1    diclofenac (CATAFLAM) 50 MG tablet, Take 1 tablet by mouth 2 times every day as needed for neck pain, Disp: 100 tablet, Rfl: 1    Insulin Pen Needle (Pen Needles) 32G X 4 MM misc, 1 each Daily., Disp: 100 each,  "Rfl: 0    ondansetron ODT (ZOFRAN-ODT) 4 MG disintegrating tablet, Place 1 tablet on top of the tongue where it will dissolve then swallow every 8 hours as needed for nausea., Disp: 30 tablet, Rfl: 1    ondansetron ODT (ZOFRAN-ODT) 4 MG disintegrating tablet, Dissolve 1 tablet on tongue then swallow every 8 hours as needed for nausea, Disp: 30 tablet, Rfl: 1    pantoprazole (PROTONIX) 40 MG EC tablet, Take 1 tablet by mouth daily., Disp: 90 tablet, Rfl: 3    pantoprazole (PROTONIX) 40 MG EC tablet, Take 1 tablet by mouth Daily., Disp: 90 tablet, Rfl: 3    Semaglutide-Weight Management (Wegovy) 2.4 MG/0.75ML solution auto-injector, Inject 2.4 mg under the skin into the appropriate area as directed 1 (One) Time Per Week., Disp: 3 mL, Rfl: 0    traZODone (DESYREL) 50 MG tablet, Take 1 tablet by mouth at bedtime as needed for insomnia., Disp: 90 tablet, Rfl: 1    traZODone (DESYREL) 50 MG tablet, Take 1 tablet by mouth at bedtime as needed for insomnia., Disp: 90 tablet, Rfl: 1    Drug Interactions  None    Relevant Laboratory Values  Lab Results   Component Value Date    CHOL 180 01/09/2023    CHLPL 194 08/26/2020    TRIG 71 01/09/2023    HDL 50 01/09/2023     (H) 01/09/2023     There is no height or weight on file to calculate BMI.    Vaccinations:   Patient recommended to keep up with routine vaccinations.     Goals of Therapy  Clinical Goals or Therapeutic Targets: 10% weight loss goal    Date 10/03/22 10/31/22 11/29/22 1/10/23 2/13/23 3/2/23 4/18/23 5/19/23 6/15/23 7/13/23 8/14/23   Weight (lb) 191.3lbs 178.6lbs 178.2 lbs 178.2 lb 176 lb 173 lb 175.6 lbs 173.6 lbs 170.8 lb 165.4 lb 166 lb   BMI kg/ 30.87 28.82 28.76 28.76 28.41 27.92 28.34 28.02 27.57 26.70 26.79   Waist Circumference (in)  40.5in 41in 36 in 37\" 36.5\"  * changed over to wegovy last visit 36\" 36.5\" 37\" 38\" 34.5\" 37\"         Medication Assessment & Plan    Patient's current BMI is 26.79, which is considered Overweight. Pt has lost 25.3 lbs " overall.  Congratulated her on her success thus far.    Will order Wegovy 2.4 mg weekly.      The following medications will need dose adjustments/closer monitoring once the GLP1 is started: None    Discussed lifestyle modifications, including diet and exercise.       Worked with patient to create SMART Goal(s):   1. Drink 8 cups (2 qts) of water every day  2. Drink 1 can of soda a day  3. Walking outside 30 min 3x a week    Will follow-up with patient in clinic in 4 weeks.       Carolina Max, PharmD  8/14/2023  08:02 EDT

## 2023-09-07 NOTE — TELEPHONE ENCOUNTER
Pt stated that her dr. Dhiraj Arango prescribed Eyvonne Halim and all of her other meds retired. Pt stated that this was discussed during her office vist.   Pl advise. Clindamycin Counseling: I counseled the patient regarding use of clindamycin as an antibiotic for prophylactic and/or therapeutic purposes. Clindamycin is active against numerous classes of bacteria, including skin bacteria. Side effects may include nausea, diarrhea, gastrointestinal upset, rash, hives, yeast infections, and in rare cases, colitis.

## 2023-09-14 ENCOUNTER — DISEASE STATE MANAGEMENT VISIT (OUTPATIENT)
Dept: PHARMACY | Facility: HOSPITAL | Age: 63
End: 2023-09-14
Payer: COMMERCIAL

## 2023-09-14 VITALS
DIASTOLIC BLOOD PRESSURE: 74 MMHG | HEIGHT: 66 IN | SYSTOLIC BLOOD PRESSURE: 134 MMHG | HEART RATE: 73 BPM | BODY MASS INDEX: 25.84 KG/M2 | WEIGHT: 160.8 LBS

## 2023-09-14 RX ORDER — SEMAGLUTIDE 2.4 MG/.75ML
2.4 INJECTION, SOLUTION SUBCUTANEOUS WEEKLY
Qty: 3 ML | Refills: 0 | Status: SHIPPED | OUTPATIENT
Start: 2023-09-14

## 2023-09-14 NOTE — PROGRESS NOTES
[facility]  Weight Management Program        Jamia Curtis is a 63 y.o. female referred to the [facility] by Dr. Jarocho Bunch for clinical pharmacy and specialty pharmacy management of Saxenda for weight management.  Jamia Curtis has previously tried walking Weight Watchers, Slim Fast, and Saxenda for weight loss.  Patient tolerated Saxenda with no issues, switched to Wegovy due to shortage. Current weight loss efforts include lifestyle changes and Wegovy. The patient denies a person history or family history of thyroid cancer and denies a personal history of pancreatitis.    Patient is currently on Wegovy 2.4 mg weekly. Patient denies any lumps/swelling/hoarseness in neck/throat or abdominal pain. Patient reports occasional nausea but that she can take a Zofran and it goes away. Patient denies any trouble giving injection or any missed doses. Patient reports that she has almost cut out soda completely. If she drinks one it's usually just 1/2 a can a day. She has also been walking for 30 minutes 3x/week and meeting her water goal.      Relevant Past Medical History and Co-morbidities  No past medical history on file.  Social History     Socioeconomic History    Marital status:        Allergies  Patient has no known allergies.    Current Medication List    Current Outpatient Medications:     diclofenac (CATAFLAM) 50 MG tablet, Take 1 tablet by mouth 2 times every day as needed for neck pain, Disp: 100 tablet, Rfl: 1    diclofenac (CATAFLAM) 50 MG tablet, Take 1 tablet by mouth 2 times every day as needed for neck pain, Disp: 100 tablet, Rfl: 1    Insulin Pen Needle (Pen Needles) 32G X 4 MM misc, 1 each Daily., Disp: 100 each, Rfl: 0    ondansetron ODT (ZOFRAN-ODT) 4 MG disintegrating tablet, Place 1 tablet on top of the tongue where it will dissolve then swallow every 8 hours as needed for nausea., Disp: 30 tablet, Rfl: 1    ondansetron ODT (ZOFRAN-ODT) 4  "MG disintegrating tablet, Dissolve 1 tablet on tongue then swallow every 8 hours as needed for nausea, Disp: 30 tablet, Rfl: 1    pantoprazole (PROTONIX) 40 MG EC tablet, Take 1 tablet by mouth daily., Disp: 90 tablet, Rfl: 3    pantoprazole (PROTONIX) 40 MG EC tablet, Take 1 tablet by mouth Daily., Disp: 90 tablet, Rfl: 3    Semaglutide-Weight Management (Wegovy) 2.4 MG/0.75ML solution auto-injector, Inject 2.4 mg under the skin into the appropriate area as directed 1 (One) Time Per Week., Disp: 3 mL, Rfl: 0    traZODone (DESYREL) 50 MG tablet, Take 1 tablet by mouth at bedtime as needed for insomnia., Disp: 90 tablet, Rfl: 1    traZODone (DESYREL) 50 MG tablet, Take 1 tablet by mouth at bedtime as needed for insomnia., Disp: 90 tablet, Rfl: 1    Drug Interactions  None    Relevant Laboratory Values  Lab Results   Component Value Date    CHOL 180 01/09/2023    CHLPL 194 08/26/2020    TRIG 71 01/09/2023    HDL 50 01/09/2023     (H) 01/09/2023     There is no height or weight on file to calculate BMI.    Vaccinations:   Patient recommended to keep up with routine vaccinations.     Goals of Therapy  Clinical Goals or Therapeutic Targets: 10% weight loss goal    Date 10/03/22 10/31/22 11/29/22 1/10/23 2/13/23 3/2/23 4/18/23 5/19/23 6/15/23 7/13/23 8/14/23   Weight (lb) 191.3lbs 178.6lbs 178.2 lbs 178.2 lb 176 lb 173 lb 175.6 lbs 173.6 lbs 170.8 lb 165.4 lb 166 lb   BMI kg/ 30.87 28.82 28.76 28.76 28.41 27.92 28.34 28.02 27.57 26.70 26.79   Waist Circumference (in)  40.5in 41in 36 in 37\" 36.5\"  * changed over to wegovy last visit 36\" 36.5\" 37\" 38\" 34.5\" 37\"     Date 9/14/23   Weight (lb) 160.8 lb   BMI kg/ 25.95   Waist Circumference (in)  33.5\"         Medication Assessment & Plan    Patient's current BMI is 25.95, which is considered Overweight. Pt has lost 30.5 lbs overall.  Congratulated her on her success thus far.    Will order Wegovy 2.4 mg weekly.      The following medications will need dose " adjustments/closer monitoring once the GLP1 is started: None    Discussed lifestyle modifications, including diet and exercise.       Worked with patient to create SMART Goal(s): Continue targeting these goals  1. Drink 8 cups (2 qts) of water every day  2. Drink 1 can of soda a day  3. Walking 30 min 3x a week    Will follow-up with patient in clinic in 4 weeks.       Carolina Max PharmD  9/14/2023  08:12 EDT

## 2023-10-12 ENCOUNTER — DISEASE STATE MANAGEMENT VISIT (OUTPATIENT)
Dept: PHARMACY | Facility: HOSPITAL | Age: 63
End: 2023-10-12
Payer: COMMERCIAL

## 2023-10-12 VITALS
HEART RATE: 79 BPM | SYSTOLIC BLOOD PRESSURE: 146 MMHG | WEIGHT: 162.6 LBS | BODY MASS INDEX: 26.24 KG/M2 | DIASTOLIC BLOOD PRESSURE: 72 MMHG

## 2023-10-12 DIAGNOSIS — E66.09 CLASS 1 OBESITY DUE TO EXCESS CALORIES WITHOUT SERIOUS COMORBIDITY WITH BODY MASS INDEX (BMI) OF 30.0 TO 30.9 IN ADULT: Primary | ICD-10-CM

## 2023-10-12 RX ORDER — SEMAGLUTIDE 2.4 MG/.75ML
2.4 INJECTION, SOLUTION SUBCUTANEOUS WEEKLY
Qty: 3 ML | Refills: 0 | Status: SHIPPED | OUTPATIENT
Start: 2023-10-12

## 2023-10-12 NOTE — PROGRESS NOTES
[facility]  Weight Management Program      Jamia Curtis is a 63 y.o. female referred to the [facility] by Dr. Jarocho Bunch for clinical pharmacy and specialty pharmacy management of GLP1 for weight management.  Jamia Curtis has previously tried walking Weight Watchers, Slim Fast, and Saxenda for weight loss.  Patient tolerated Saxenda with no issues, switched to Wegovy due to shortage. Current weight loss efforts include lifestyle changes and Wegovy. The patient denies a person history or family history of thyroid cancer and denies a personal history of pancreatitis.    Patient is currently on Wegovy 2.4 mg weekly. She denies any side effects.  She did miss 1 dose accidentally due to vacation and contributes her weight gain to vacation       Relevant Past Medical History and Co-morbidities  No past medical history on file.  Social History     Socioeconomic History    Marital status:        Allergies  Patient has no known allergies.    Current Medication List    Current Outpatient Medications:     pantoprazole (PROTONIX) 40 MG EC tablet, Take 1 tablet by mouth Daily., Disp: 90 tablet, Rfl: 3    Semaglutide-Weight Management (Wegovy) 2.4 MG/0.75ML solution auto-injector, Inject 2.4 mg under the skin into the appropriate area as directed 1 (One) Time Per Week., Disp: 3 mL, Rfl: 0    traZODone (DESYREL) 50 MG tablet, Take 1 tablet by mouth at bedtime as needed for insomnia., Disp: 90 tablet, Rfl: 1    diclofenac (CATAFLAM) 50 MG tablet, Take 1 tablet by mouth 2 times every day as needed for neck pain, Disp: 100 tablet, Rfl: 1    Drug Interactions  None    Relevant Laboratory Values  Lab Results   Component Value Date    CHOL 180 01/09/2023    CHLPL 194 08/26/2020    TRIG 71 01/09/2023    HDL 50 01/09/2023     (H) 01/09/2023     Body mass index is 26.24 kg/mý.    Vaccinations:   Patient recommended to keep up with routine vaccinations.     Goals of  "Therapy  Clinical Goals or Therapeutic Targets: 10% weight loss goal    Date 10/03/22 10/31/22 11/29/22 1/10/23 2/13/23 3/2/23 4/18/23 5/19/23 6/15/23 7/13/23 8/14/23   Weight (lb) 191.3lbs 178.6lbs 178.2 lbs 178.2 lb 176 lb 173 lb 175.6 lbs 173.6 lbs 170.8 lb 165.4 lb 166 lb   BMI kg/ 30.87 28.82 28.76 28.76 28.41 27.92 28.34 28.02 27.57 26.70 26.79   Waist Circumference (in)  40.5in 41in 36 in 37\" 36.5\"  * changed over to wegovy last visit 36\" 36.5\" 37\" 38\" 34.5\" 37\"     Date 9/14/23 10/12/23   Weight (lb) 160.8 lb 162.6 lb   BMI kg/ 25.95 26   Waist Circumference (in)  33.5\" 35.5\"         Medication Assessment & Plan    Patient's current BMI is 26, which is considered Overweight. Pt has lost 28.7lbs overall.  Congratulated her on her success thus far.    Will order Wegovy 2.4 mg weekly.      The following medications will need dose adjustments/closer monitoring once the GLP1 is started: None    Discussed lifestyle modifications, including diet and exercise.       Worked with patient to create SMART Goal(s): Continue targeting these goals  1. Drink 8 cups of water every day  2. Drink 1 can of soda a day  3. Walking 30 min everyday    Will follow-up with patient in clinic in 4 weeks.   Kat Sharp, PharmD  10/12/2023  08:27 EDT  "

## 2023-11-09 ENCOUNTER — DISEASE STATE MANAGEMENT VISIT (OUTPATIENT)
Dept: PHARMACY | Facility: HOSPITAL | Age: 63
End: 2023-11-09
Payer: COMMERCIAL

## 2023-11-09 VITALS
DIASTOLIC BLOOD PRESSURE: 54 MMHG | BODY MASS INDEX: 26 KG/M2 | HEART RATE: 67 BPM | WEIGHT: 161.8 LBS | SYSTOLIC BLOOD PRESSURE: 144 MMHG | HEIGHT: 66 IN

## 2023-11-09 RX ORDER — SEMAGLUTIDE 2.4 MG/.75ML
2.4 INJECTION, SOLUTION SUBCUTANEOUS WEEKLY
Qty: 3 ML | Refills: 0 | Status: SHIPPED | OUTPATIENT
Start: 2023-11-09

## 2023-11-09 NOTE — PROGRESS NOTES
[facility]  Weight Management Program      Jamia Curtis is a 63 y.o. female referred to the [facility] by Dr. Jarocho Bunch for clinical pharmacy and specialty pharmacy management of GLP1 for weight management.  Jamia Curtis has previously tried walking Weight Watchers, Slim Fast, and Saxenda for weight loss.  Patient tolerated Saxenda with no issues, switched to Wegovy due to shortage. Current weight loss efforts include lifestyle changes and Wegovy. The patient denies a person history or family history of thyroid cancer and denies a personal history of pancreatitis.    Patient is currently on Wegovy 2.4 mg weekly. She denies any side effects.  Patient denies any trouble giving injection or missed doses. Patient reports that she has had a harder time meeting her water goal this month but is going to focus in on it the next few weeks before she leaves for a cruise. Patient wants to continue with Wegovy and targeting SMART goals.       Relevant Past Medical History and Co-morbidities  No past medical history on file.  Social History     Socioeconomic History    Marital status:        Allergies  Patient has no known allergies.    Current Medication List    Current Outpatient Medications:     diclofenac (CATAFLAM) 50 MG tablet, Take 1 tablet by mouth 2 times every day as needed for neck pain, Disp: 100 tablet, Rfl: 1    pantoprazole (PROTONIX) 40 MG EC tablet, Take 1 tablet by mouth Daily., Disp: 90 tablet, Rfl: 3    Semaglutide-Weight Management (Wegovy) 2.4 MG/0.75ML solution auto-injector, Inject 2.4 mg under the skin into the appropriate area as directed 1 (One) Time Per Week., Disp: 3 mL, Rfl: 0    traZODone (DESYREL) 50 MG tablet, Take 1 tablet by mouth at bedtime as needed for insomnia., Disp: 90 tablet, Rfl: 1    Drug Interactions  None    Relevant Laboratory Values  Lab Results   Component Value Date    CHOL 180 01/09/2023    CHLPL 194 08/26/2020     "TRIG 71 01/09/2023    HDL 50 01/09/2023     (H) 01/09/2023     Body mass index is 26.12 kg/m².    Vaccinations:   Patient recommended to keep up with routine vaccinations.     Goals of Therapy  Clinical Goals or Therapeutic Targets: 10% weight loss goal    Date 10/03/22 10/31/22 11/29/22 1/10/23 2/13/23 3/2/23 4/18/23 5/19/23 6/15/23 7/13/23 8/14/23   Weight (lb) 191.3lbs 178.6lbs 178.2 lbs 178.2 lb 176 lb 173 lb 175.6 lbs 173.6 lbs 170.8 lb 165.4 lb 166 lb   BMI kg/ 30.87 28.82 28.76 28.76 28.41 27.92 28.34 28.02 27.57 26.70 26.79   Waist Circumference (in)  40.5in 41in 36 in 37\" 36.5\"  * changed over to wegovy last visit 36\" 36.5\" 37\" 38\" 34.5\" 37\"     Date 9/14/23 10/12/23 11/9/23   Weight (lb) 160.8 lb 162.6 lb 161.8 lbs   BMI kg/ 25.95 26 26.12   Waist Circumference (in)  33.5\" 35.5\" 36\"         Medication Assessment & Plan    Patient's current BMI is 26, which is considered Overweight. Pt has lost 29.5 lbs overall.  Congratulated her on her success thus far.    Will order Wegovy 2.4 mg weekly.      The following medications will need dose adjustments/closer monitoring once the GLP1 is started: None    Patient reported she was going to get lab work and has forgotten. She anticipates getting the lab work done tonight when she returns for work.     Discussed lifestyle modifications, including diet and exercise.       Worked with patient to create SMART Goal(s): Continue targeting these goals  1. Drink 8 cups of water every day  2. Drink 1 can of soda a day  3. Walking 30 min everyday    Will follow-up with patient in clinic in 4 weeks.   Vera Gonzalez. Alicia, PharmD  11/9/2023  07:36 EST  " [Y] : Positive pregnancy history [Menarche Age: ____] : age at menarche was [unfilled] [Currently Active] : currently active [Men] : men [Vaginal] : vaginal [No] : No [Patient refuses STI testing] : Patient refuses STI testing [Patient reported mammogram was normal] : Patient reported mammogram was normal [Patient reported breast sonogram was normal] : Patient reported breast sonogram was normal [Mammogramdate] : 07/28/20 [BreastSonogramDate] : 07/28/20 [PapSmeardate] : 10/27/20 [TextBox_31] : neg [LMPDate] : 11/12/20 [PGHxTotal] : 2 [[facility] : 2 [FreeTextEntry1] : 11/12/20

## 2023-11-27 ENCOUNTER — LAB (OUTPATIENT)
Dept: LAB | Facility: HOSPITAL | Age: 63
End: 2023-11-27
Payer: COMMERCIAL

## 2023-11-27 DIAGNOSIS — E66.09 CLASS 1 OBESITY DUE TO EXCESS CALORIES WITHOUT SERIOUS COMORBIDITY WITH BODY MASS INDEX (BMI) OF 30.0 TO 30.9 IN ADULT: ICD-10-CM

## 2023-11-27 LAB
CHOLEST SERPL-MCNC: 157 MG/DL (ref 0–200)
HDLC SERPL-MCNC: 50 MG/DL (ref 40–60)
LDLC SERPL CALC-MCNC: 93 MG/DL (ref 0–100)
LDLC/HDLC SERPL: 1.86 {RATIO}
TRIGL SERPL-MCNC: 70 MG/DL (ref 0–150)
VLDLC SERPL-MCNC: 14 MG/DL (ref 5–40)

## 2023-11-27 PROCEDURE — 80061 LIPID PANEL: CPT

## 2023-11-27 PROCEDURE — 36415 COLL VENOUS BLD VENIPUNCTURE: CPT

## 2023-12-11 ENCOUNTER — DISEASE STATE MANAGEMENT VISIT (OUTPATIENT)
Dept: PHARMACY | Facility: HOSPITAL | Age: 63
End: 2023-12-11
Payer: COMMERCIAL

## 2023-12-11 VITALS
HEIGHT: 66 IN | BODY MASS INDEX: 25.81 KG/M2 | SYSTOLIC BLOOD PRESSURE: 142 MMHG | HEART RATE: 77 BPM | WEIGHT: 160.6 LBS | DIASTOLIC BLOOD PRESSURE: 79 MMHG

## 2023-12-11 RX ORDER — SEMAGLUTIDE 2.4 MG/.75ML
2.4 INJECTION, SOLUTION SUBCUTANEOUS WEEKLY
Qty: 3 ML | Refills: 0 | Status: SHIPPED | OUTPATIENT
Start: 2023-12-11

## 2023-12-11 NOTE — PROGRESS NOTES
[facility]  Weight Management Program      Jamia Curtis is a 63 y.o. female referred to the [facility] by Dr. Jarocho Bunch for clinical pharmacy and specialty pharmacy management of GLP1 for weight management.  Jamia Curtis has previously tried walking Weight Watchers, Slim Fast, and Saxenda for weight loss.  Patient tolerated Saxenda with no issues, switched to Wegovy due to shortage. Current weight loss efforts include lifestyle changes and Wegovy. The patient denies a person history or family history of thyroid cancer and denies a personal history of pancreatitis.    Patient is currently on Wegovy 2.4 mg weekly. Patient denies any lumps/swelling/hoarseness in neck/throat or severe abdominal pain.  She denies any side effects.  Patient denies any trouble giving injection or missed doses. Patient reports that she has been on a cruise for 13 days and was very happy with loss this month. She reported that she maybe had 1 soda/pop and that she drank a lot of water.  Patient wants to continue with Wegovy and targeting SMART goals.     Patient reports only new medication started was Xidra eye drops.       Relevant Past Medical History and Co-morbidities  No past medical history on file.  Social History     Socioeconomic History    Marital status:        Allergies  Patient has no known allergies.    Current Medication List    Current Outpatient Medications:     diclofenac (CATAFLAM) 50 MG tablet, Take 1 tablet by mouth 2 times every day as needed for neck pain, Disp: 100 tablet, Rfl: 1    Lifitegrast (Xiidra) 5 % ophthalmic solution, Instill 1 drop in affected eye(s) twice daily, Disp: 180 each, Rfl: 3    pantoprazole (PROTONIX) 40 MG EC tablet, Take 1 tablet by mouth Daily., Disp: 90 tablet, Rfl: 3    Semaglutide-Weight Management (Wegovy) 2.4 MG/0.75ML solution auto-injector, Inject 2.4 mg under the skin into the appropriate area as directed 1 (One) Time  "Per Week., Disp: 3 mL, Rfl: 0    traZODone (DESYREL) 50 MG tablet, Take 1 tablet by mouth at bedtime as needed for insomnia., Disp: 90 tablet, Rfl: 1    Drug Interactions  None    Relevant Laboratory Values  Lab Results   Component Value Date    CHOL 157 11/27/2023    CHLPL 194 08/26/2020    TRIG 70 11/27/2023    HDL 50 11/27/2023    LDL 93 11/27/2023     There is no height or weight on file to calculate BMI.    Vaccinations:   Patient recommended to keep up with routine vaccinations.     Goals of Therapy  Clinical Goals or Therapeutic Targets: 10% weight loss goal    Date 10/03/22 10/31/22 11/29/22 1/10/23 2/13/23 3/2/23 4/18/23 5/19/23 6/15/23 7/13/23 8/14/23   Weight (lb) 191.3lbs 178.6lbs 178.2 lbs 178.2 lb 176 lb 173 lb 175.6 lbs 173.6 lbs 170.8 lb 165.4 lb 166 lb   BMI kg/ 30.87 28.82 28.76 28.76 28.41 27.92 28.34 28.02 27.57 26.70 26.79   Waist Circumference (in)  40.5in 41in 36 in 37\" 36.5\"  * changed over to wegovy last visit 36\" 36.5\" 37\" 38\" 34.5\" 37\"     Date 9/14/23 10/12/23 11/9/23 12/11/23   Weight (lb) 160.8 lb 162.6 lb 161.8 lbs 160.6 lb   BMI kg/ 25.95 26 26.12 25.92   Waist Circumference (in)  33.5\" 35.5\" 36\" 35.25\"         Medication Assessment & Plan    Patient's current BMI is 25.92 , which is considered Overweight. Pt has lost 30.7 lbs overall.  Congratulated her on her success thus far.    Will order Wegovy 2.4 mg weekly.      The following medications will need dose adjustments/closer monitoring once the GLP1 is started: None    Patient did go get labs, however, they only el the Lipid Panel and did not get other order sets. Patient Lipid panel WNL and reviewed. Patient will try to go back in 4 weeks to get remainder of lab work.     Discussed lifestyle modifications, including diet and exercise.       Worked with patient to create SMART Goal(s): Continue targeting these goals  1. Drink 8 cups of water every day  2. Drink 1 can of soda a day  3. Walking 30 min everyday    Will follow-up " with patient in clinic in 4 weeks.     Vera Gonzalez. Alicia, PharmD  12/11/2023  14:28 EST

## 2024-01-08 ENCOUNTER — DISEASE STATE MANAGEMENT VISIT (OUTPATIENT)
Dept: PHARMACY | Facility: HOSPITAL | Age: 64
End: 2024-01-08
Payer: COMMERCIAL

## 2024-01-08 VITALS
WEIGHT: 161.2 LBS | DIASTOLIC BLOOD PRESSURE: 52 MMHG | HEIGHT: 66 IN | HEART RATE: 70 BPM | SYSTOLIC BLOOD PRESSURE: 129 MMHG | BODY MASS INDEX: 25.91 KG/M2

## 2024-01-08 RX ORDER — MULTIPLE VITAMINS W/ MINERALS TAB 9MG-400MCG
2 TAB ORAL DAILY
COMMUNITY

## 2024-01-08 RX ORDER — SEMAGLUTIDE 2.4 MG/.75ML
2.4 INJECTION, SOLUTION SUBCUTANEOUS WEEKLY
Qty: 3 ML | Refills: 0 | Status: SHIPPED | OUTPATIENT
Start: 2024-01-08

## 2024-01-08 NOTE — PROGRESS NOTES
[facility]  Weight Management Program      Jamia Curtis is a 63 y.o. female referred to the [facility] by Dr. Jarocho Bunch for clinical pharmacy and specialty pharmacy management of GLP1 for weight management.  Jamia Curtis has previously tried walking Weight Watchers, Slim Fast, and Saxenda for weight loss.  Patient tolerated Saxenda with no issues, switched to Wegovy due to shortage. Current weight loss efforts include lifestyle changes and Wegovy. The patient denies a person history or family history of thyroid cancer and denies a personal history of pancreatitis.    Patient is currently on Wegovy 2.4 mg weekly. Patient denies any lumps/swelling/hoarseness in neck/throat or severe abdominal pain.  She denies any side effects.  Patient denies any trouble giving injection or missed doses. Patient reports that she has eaten a little more through holiday season and has been on a few trips. Patient reports that she has continued to do pretty well with all of her smart goal and Patient wants to continue with Wegovy and targeting SMART goals.     Patient reports only new medication started was Xidra eye drops.       Relevant Past Medical History and Co-morbidities  No past medical history on file.  Social History     Socioeconomic History    Marital status:        Allergies  Patient has no known allergies.    Current Medication List    Current Outpatient Medications:     diclofenac (CATAFLAM) 50 MG tablet, Take 1 tablet by mouth 2 times every day as needed for neck pain, Disp: 100 tablet, Rfl: 1    Lifitegrast (Xiidra) 5 % ophthalmic solution, Instill 1 drop in affected eye(s) twice daily, Disp: 180 each, Rfl: 3    pantoprazole (PROTONIX) 40 MG EC tablet, Take 1 tablet by mouth Daily., Disp: 90 tablet, Rfl: 3    Semaglutide-Weight Management (Wegovy) 2.4 MG/0.75ML solution auto-injector, Inject 2.4 mg under the skin into the appropriate area as directed 1  "(One) Time Per Week., Disp: 3 mL, Rfl: 0    traZODone (DESYREL) 50 MG tablet, Take 1 tablet by mouth at bedtime as needed for insomnia., Disp: 90 tablet, Rfl: 1    Drug Interactions  None    Relevant Laboratory Values  Lab Results   Component Value Date    CHOL 157 11/27/2023    CHLPL 194 08/26/2020    TRIG 70 11/27/2023    HDL 50 11/27/2023    LDL 93 11/27/2023     There is no height or weight on file to calculate BMI.    Vaccinations:   Patient recommended to keep up with routine vaccinations.     Goals of Therapy  Clinical Goals or Therapeutic Targets: 10% weight loss goal    Date 10/03/22 10/31/22 11/29/22 1/10/23 2/13/23 3/2/23 4/18/23 5/19/23 6/15/23 7/13/23 8/14/23   Weight (lb) 191.3lbs 178.6lbs 178.2 lbs 178.2 lb 176 lb 173 lb 175.6 lbs 173.6 lbs 170.8 lb 165.4 lb 166 lb   BMI kg/ 30.87 28.82 28.76 28.76 28.41 27.92 28.34 28.02 27.57 26.70 26.79   Waist Circumference (in)  40.5in 41in 36 in 37\" 36.5\"  * changed over to wegovy last visit 36\" 36.5\" 37\" 38\" 34.5\" 37\"     Date 9/14/23 10/12/23 11/9/23 12/11/23 1/8/24   Weight (lb) 160.8 lb 162.6 lb 161.8 lbs 160.6 lb 161.2 lb   BMI kg/ 25.95 26 26.12 25.92 26.02   Waist Circumference (in)  33.5\" 35.5\" 36\" 35.25\" 35.5\"         Medication Assessment & Plan    Patient's current BMI is 25.92 , which is considered Overweight. Pt has lost 30.7 lbs overall.  Congratulated her on her success thus far. Patient gained weight this visit and attributes it to the holidays.     Will order Wegovy 2.4 mg weekly.      The following medications will need dose adjustments/closer monitoring once the GLP1 is started: None    Patient did go get labs, however, they only el the Lipid Panel and did not get other order sets. Patient Lipid panel WNL and reviewed. Patient will try to go back in 4 weeks to get remainder of lab work. Patient did not get these complete and was again reminded today that these do need to be completed before next visit.     Discussed lifestyle modifications, " including diet and exercise.       Worked with patient to create SMART Goal(s): Continue targeting these goals  1. Drink 8 cups of water every day  2. Drink 1 can of soda a day  3. Walking 30 min everyday    Will follow-up with patient in clinic in 4 weeks.     Vera Gonzalez. Alicia, PharmD  1/8/2024  15:48 EST

## 2024-01-09 ENCOUNTER — TRANSCRIBE ORDERS (OUTPATIENT)
Dept: ADMINISTRATIVE | Facility: HOSPITAL | Age: 64
End: 2024-01-09
Payer: COMMERCIAL

## 2024-01-09 DIAGNOSIS — Z12.31 BREAST CANCER SCREENING BY MAMMOGRAM: Primary | ICD-10-CM

## 2024-01-24 ENCOUNTER — LAB (OUTPATIENT)
Dept: LAB | Facility: HOSPITAL | Age: 64
End: 2024-01-24
Payer: COMMERCIAL

## 2024-01-24 DIAGNOSIS — E66.09 CLASS 1 OBESITY DUE TO EXCESS CALORIES WITHOUT SERIOUS COMORBIDITY WITH BODY MASS INDEX (BMI) OF 30.0 TO 30.9 IN ADULT: ICD-10-CM

## 2024-01-24 PROCEDURE — 80053 COMPREHEN METABOLIC PANEL: CPT

## 2024-01-24 PROCEDURE — 83036 HEMOGLOBIN GLYCOSYLATED A1C: CPT

## 2024-01-24 PROCEDURE — 36415 COLL VENOUS BLD VENIPUNCTURE: CPT

## 2024-01-24 PROCEDURE — 84443 ASSAY THYROID STIM HORMONE: CPT

## 2024-01-25 LAB
ALBUMIN SERPL-MCNC: 4.3 G/DL (ref 3.5–5.2)
ALBUMIN/GLOB SERPL: 2 G/DL
ALP SERPL-CCNC: 87 U/L (ref 39–117)
ALT SERPL W P-5'-P-CCNC: 21 U/L (ref 1–33)
ANION GAP SERPL CALCULATED.3IONS-SCNC: 12.5 MMOL/L (ref 5–15)
AST SERPL-CCNC: 19 U/L (ref 1–32)
BILIRUB SERPL-MCNC: 0.4 MG/DL (ref 0–1.2)
BUN SERPL-MCNC: 14 MG/DL (ref 8–23)
BUN/CREAT SERPL: 17.5 (ref 7–25)
CALCIUM SPEC-SCNC: 8.9 MG/DL (ref 8.6–10.5)
CHLORIDE SERPL-SCNC: 105 MMOL/L (ref 98–107)
CO2 SERPL-SCNC: 24.5 MMOL/L (ref 22–29)
CREAT SERPL-MCNC: 0.8 MG/DL (ref 0.57–1)
EGFRCR SERPLBLD CKD-EPI 2021: 83.4 ML/MIN/1.73
GLOBULIN UR ELPH-MCNC: 2.2 GM/DL
GLUCOSE SERPL-MCNC: 79 MG/DL (ref 65–99)
HBA1C MFR BLD: 5.3 % (ref 4.8–5.6)
POTASSIUM SERPL-SCNC: 4.5 MMOL/L (ref 3.5–5.2)
PROT SERPL-MCNC: 6.5 G/DL (ref 6–8.5)
SODIUM SERPL-SCNC: 142 MMOL/L (ref 136–145)
TSH SERPL DL<=0.05 MIU/L-ACNC: 1.29 UIU/ML (ref 0.27–4.2)

## 2024-02-06 ENCOUNTER — DISEASE STATE MANAGEMENT VISIT (OUTPATIENT)
Dept: PHARMACY | Facility: HOSPITAL | Age: 64
End: 2024-02-06
Payer: COMMERCIAL

## 2024-02-06 VITALS
BODY MASS INDEX: 25.78 KG/M2 | HEART RATE: 62 BPM | WEIGHT: 160.4 LBS | DIASTOLIC BLOOD PRESSURE: 70 MMHG | SYSTOLIC BLOOD PRESSURE: 123 MMHG | HEIGHT: 66 IN

## 2024-02-06 RX ORDER — SEMAGLUTIDE 2.4 MG/.75ML
2.4 INJECTION, SOLUTION SUBCUTANEOUS WEEKLY
Qty: 3 ML | Refills: 0 | Status: SHIPPED | OUTPATIENT
Start: 2024-02-06

## 2024-02-06 NOTE — PROGRESS NOTES
[facility]  Weight Management Program      Jamia Curtis is a 64 y.o. female referred to the [facility] by Dr. Jarocho Bunch for clinical pharmacy and specialty pharmacy management of GLP1 for weight management.  Jamia Curtis has previously tried walking Weight Watchers, Slim Fast, and Saxenda for weight loss.  Patient tolerated Saxenda with no issues, switched to Wegovy due to shortage. Current weight loss efforts include lifestyle changes and Wegovy. The patient denies a person history or family history of thyroid cancer and denies a personal history of pancreatitis.    Patient is currently on Wegovy 2.4 mg weekly. Patient denies any lumps/swelling/hoarseness in neck/throat or severe abdominal pain.  She denies any side effects.  Patient denies any trouble giving injection or missed doses. Patient reports that she has continued to do pretty well with all of her smart goal and Patient wants to continue with Wegovy and targeting SMART goals.     Patient denied any changes to medications or allergies.       Relevant Past Medical History and Co-morbidities  No past medical history on file.  Social History     Socioeconomic History    Marital status:        Allergies  Patient has no known allergies.    Current Medication List    Current Outpatient Medications:     diclofenac (CATAFLAM) 50 MG tablet, Take 1 tablet by mouth 2 times every day as needed for neck pain, Disp: 100 tablet, Rfl: 1    Lifitegrast (Xiidra) 5 % ophthalmic solution, Instill 1 drop in affected eye(s) twice daily, Disp: 180 each, Rfl: 3    multivitamin with minerals (Hair Skin and Nails Formula) tablet tablet, Take 2 tablets by mouth Daily. Patient taking OTC gummies, Disp: , Rfl:     pantoprazole (PROTONIX) 40 MG EC tablet, Take 1 tablet by mouth Daily., Disp: 90 tablet, Rfl: 3    Semaglutide-Weight Management (Wegovy) 2.4 MG/0.75ML solution auto-injector, Inject 2.4 mg under the skin into  "the appropriate area as directed 1 (One) Time Per Week., Disp: 3 mL, Rfl: 0    traZODone (DESYREL) 50 MG tablet, Take 1 tablet by mouth at bedtime as needed for insomnia., Disp: 90 tablet, Rfl: 1    Drug Interactions  None    Relevant Laboratory Values  Lab Results   Component Value Date    CHOL 157 11/27/2023    CHLPL 194 08/26/2020    TRIG 70 11/27/2023    HDL 50 11/27/2023    LDL 93 11/27/2023     There is no height or weight on file to calculate BMI.    Vaccinations:   Patient recommended to keep up with routine vaccinations.     Goals of Therapy  Clinical Goals or Therapeutic Targets: 10% weight loss goal    Date 10/03/22 10/31/22 11/29/22 1/10/23 2/13/23 3/2/23 4/18/23 5/19/23 6/15/23 7/13/23 8/14/23   Weight (lb) 191.3lbs 178.6lbs 178.2 lbs 178.2 lb 176 lb 173 lb 175.6 lbs 173.6 lbs 170.8 lb 165.4 lb 166 lb   BMI kg/ 30.87 28.82 28.76 28.76 28.41 27.92 28.34 28.02 27.57 26.70 26.79   Waist Circumference (in)  40.5in 41in 36 in 37\" 36.5\"  * changed over to wegovy last visit 36\" 36.5\" 37\" 38\" 34.5\" 37\"     Date 9/14/23 10/12/23 11/9/23 12/11/23 1/8/24 2/6/24   Weight (lb) 160.8 lb 162.6 lb 161.8 lbs 160.6 lb 161.2 lb 160.4 lb   BMI kg/ 25.95 26 26.12 25.92 26.02 25.89   Waist Circumference (in)  33.5\" 35.5\" 36\" 35.25\" 35.5\" 36.25\"         Medication Assessment & Plan    Patient's current BMI is 25.89 , which is considered Overweight. Pt has lost 31.5 lbs overall.  Congratulated her on her success thus far. Patient gained weight this visit and attributes it to the holidays.     Will order Wegovy 2.4 mg weekly.      The following medications will need dose adjustments/closer monitoring once the GLP1 is started: None    Patient labs reviewed. WNL.     Discussed lifestyle modifications, including diet and exercise.       Worked with patient to create SMART Goal(s): Continue targeting these goals  1. Drink 8 cups of water every day  2. Drink 1 can of soda a day  3. Walking 30 min everyday    Will follow-up with " patient in clinic in 4 weeks.     Vera Gonzalez. Alicia, PharmD  2/6/2024  08:08 EST

## 2024-03-27 ENCOUNTER — DISEASE STATE MANAGEMENT VISIT (OUTPATIENT)
Dept: PHARMACY | Facility: HOSPITAL | Age: 64
End: 2024-03-27
Payer: COMMERCIAL

## 2024-03-27 VITALS
DIASTOLIC BLOOD PRESSURE: 77 MMHG | BODY MASS INDEX: 24.59 KG/M2 | SYSTOLIC BLOOD PRESSURE: 155 MMHG | WEIGHT: 153 LBS | HEART RATE: 66 BPM | HEIGHT: 66 IN

## 2024-03-27 RX ORDER — SEMAGLUTIDE 2.4 MG/.75ML
2.4 INJECTION, SOLUTION SUBCUTANEOUS WEEKLY
Qty: 3 ML | Refills: 0 | Status: SHIPPED | OUTPATIENT
Start: 2024-03-27

## 2024-03-27 NOTE — PROGRESS NOTES
[facility]  Weight Management Program      Jamia Curtis is a 64 y.o. female referred to the [facility] by Dr. Jarocho Bunch for clinical pharmacy and specialty pharmacy management of GLP1 for weight management.  Jamia Curtis has previously tried walking Weight Watchers, Slim Fast, and Saxenda for weight loss.  Patient tolerated Saxenda with no issues, switched to Wegovy due to shortage. Current weight loss efforts include lifestyle changes and Wegovy. The patient denies a person history or family history of thyroid cancer and denies a personal history of pancreatitis.    Patient is currently on Wegovy 2.4 mg weekly. Patient denies any lumps/swelling/hoarseness in neck/throat or severe abdominal pain.  She denies any side effects and reports tolerating medication well.  Patient denies any trouble giving injection or missed doses but has been behind on this last dose. Patient had a family tragedy ~6 weeks ago which has limited her to be able to have a normal exercise routine and target her water goal the best.     Patient denied any changes to medications or allergies. Patient blood pressure was slightly elevated in clinic today.       Relevant Past Medical History and Co-morbidities  No past medical history on file.  Social History     Socioeconomic History    Marital status:        Allergies  Patient has no known allergies.    Current Medication List    Current Outpatient Medications:     diclofenac (CATAFLAM) 50 MG tablet, Take 1 tablet by mouth 2 times every day as needed for neck pain, Disp: 100 tablet, Rfl: 1    Lifitegrast (Xiidra) 5 % ophthalmic solution, Instill 1 drop in affected eye(s) twice daily, Disp: 180 each, Rfl: 3    multivitamin with minerals (Hair Skin and Nails Formula) tablet tablet, Take 2 tablets by mouth Daily. Patient taking OTC gummies, Disp: , Rfl:     pantoprazole (PROTONIX) 40 MG EC tablet, Take 1 tablet by mouth Daily., Disp:  "90 tablet, Rfl: 3    Semaglutide-Weight Management (Wegovy) 2.4 MG/0.75ML solution auto-injector, Inject 2.4 mg under the skin into the appropriate area as directed 1 (One) Time Per Week., Disp: 3 mL, Rfl: 0    traZODone (DESYREL) 50 MG tablet, Take 1 tablet by mouth at bedtime as needed for insomnia., Disp: 90 tablet, Rfl: 1    Drug Interactions  None    Relevant Laboratory Values  Lab Results   Component Value Date    CHOL 157 11/27/2023    CHLPL 194 08/26/2020    TRIG 70 11/27/2023    HDL 50 11/27/2023    LDL 93 11/27/2023     There is no height or weight on file to calculate BMI.    Vaccinations:   Patient recommended to keep up with routine vaccinations.     Goals of Therapy  Clinical Goals or Therapeutic Targets: 10% weight loss goal    Date 10/03/22 10/31/22 11/29/22 1/10/23 2/13/23 3/2/23 4/18/23 5/19/23 6/15/23 7/13/23 8/14/23   Weight (lb) 191.3lbs 178.6lbs 178.2 lbs 178.2 lb 176 lb 173 lb 175.6 lbs 173.6 lbs 170.8 lb 165.4 lb 166 lb   BMI kg/ 30.87 28.82 28.76 28.76 28.41 27.92 28.34 28.02 27.57 26.70 26.79   Waist Circumference (in)  40.5in 41in 36 in 37\" 36.5\"  * changed over to wegovy last visit 36\" 36.5\" 37\" 38\" 34.5\" 37\"     Date 9/14/23 10/12/23 11/9/23 12/11/23 1/8/24 2/6/24 3/27/24   Weight (lb) 160.8 lb 162.6 lb 161.8 lbs 160.6 lb 161.2 lb 160.4 lb 153 lb   BMI kg/ 25.95 26 26.12 25.92 26.02 25.89 24.69   Waist Circumference (in)  33.5\" 35.5\" 36\" 35.25\" 35.5\" 36.25\" 34\"         Medication Assessment & Plan    Patient's current BMI is 25.89 , which is considered normal weight. Pt has lost 38.3 lbs overall.  Congratulated her on her success thus far.     Will order Wegovy 2.4 mg weekly.      The following medications will need dose adjustments/closer monitoring once the GLP1 is started: None    Patient BP slightly elevated today. Patient reports she hasn't been checking routinely but this is not normal for her. Patient reported that she is going to be working tonight and will have her co-workers " check it. She will also try to monitor at home and was encouraged to reach out to PCP if it continued to stay elevated.     Discussed lifestyle modifications, including diet and exercise.       Worked with patient to create SMART Goal(s): Continue targeting these goals  1. Drink 8 cups of water every day  2. Drink 1 can of soda a day  3. Walking 30 min everyday    Will follow-up with patient in clinic in 4 weeks.     Vera Gonzalez. Alicia, PharmD  3/27/2024  15:19 EDT

## 2024-08-19 ENCOUNTER — HOSPITAL ENCOUNTER (OUTPATIENT)
Dept: MAMMOGRAPHY | Facility: HOSPITAL | Age: 64
Discharge: HOME | End: 2024-08-19
Admitting: INTERNAL MEDICINE
Payer: COMMERCIAL

## 2024-08-19 DIAGNOSIS — Z12.31 BREAST CANCER SCREENING BY MAMMOGRAM: ICD-10-CM

## 2024-08-19 PROCEDURE — 77063 BREAST TOMOSYNTHESIS BI: CPT | Performed by: RADIOLOGY

## 2024-08-19 PROCEDURE — 77063 BREAST TOMOSYNTHESIS BI: CPT

## 2024-08-19 PROCEDURE — 77067 SCR MAMMO BI INCL CAD: CPT | Performed by: RADIOLOGY

## 2024-08-19 PROCEDURE — 77067 SCR MAMMO BI INCL CAD: CPT

## 2025-08-04 ENCOUNTER — APPOINTMENT (OUTPATIENT)
Dept: CT IMAGING | Facility: HOSPITAL | Age: 65
End: 2025-08-04
Payer: COMMERCIAL

## 2025-08-04 ENCOUNTER — HOSPITAL ENCOUNTER (EMERGENCY)
Facility: HOSPITAL | Age: 65
Discharge: HOME | End: 2025-08-04
Attending: EMERGENCY MEDICINE | Admitting: EMERGENCY MEDICINE
Payer: COMMERCIAL

## 2025-08-04 VITALS
BODY MASS INDEX: 28.93 KG/M2 | SYSTOLIC BLOOD PRESSURE: 167 MMHG | RESPIRATION RATE: 18 BRPM | HEIGHT: 66 IN | OXYGEN SATURATION: 99 % | DIASTOLIC BLOOD PRESSURE: 75 MMHG | HEART RATE: 68 BPM | WEIGHT: 180 LBS | TEMPERATURE: 98 F

## 2025-08-04 DIAGNOSIS — K57.90 DIVERTICULOSIS: ICD-10-CM

## 2025-08-04 DIAGNOSIS — A05.8 YERSINIA ENTEROCOLITICA FOOD POISONING: Primary | ICD-10-CM

## 2025-08-04 DIAGNOSIS — K80.20 CALCULUS OF GALLBLADDER WITHOUT CHOLECYSTITIS WITHOUT OBSTRUCTION: ICD-10-CM

## 2025-08-04 LAB
ADV 40+41 DNA STL QL NAA+NON-PROBE: NOT DETECTED
ALBUMIN SERPL-MCNC: 4.3 G/DL (ref 3.5–5.2)
ALBUMIN/GLOB SERPL: 1.9 G/DL
ALP SERPL-CCNC: 93 U/L (ref 39–117)
ALT SERPL W P-5'-P-CCNC: 32 U/L (ref 1–33)
ANION GAP SERPL CALCULATED.3IONS-SCNC: 9.7 MMOL/L (ref 5–15)
AST SERPL-CCNC: 24 U/L (ref 1–32)
ASTRO TYP 1-8 RNA STL QL NAA+NON-PROBE: NOT DETECTED
BASOPHILS # BLD AUTO: 0.06 10*3/MM3 (ref 0–0.2)
BASOPHILS NFR BLD AUTO: 1 % (ref 0–1.5)
BILIRUB SERPL-MCNC: 0.4 MG/DL (ref 0–1.2)
BUN SERPL-MCNC: 10.5 MG/DL (ref 8–23)
BUN/CREAT SERPL: 15.9 (ref 7–25)
C CAYETANENSIS DNA STL QL NAA+NON-PROBE: NOT DETECTED
C COLI+JEJ+UPSA DNA STL QL NAA+NON-PROBE: NOT DETECTED
CALCIUM SPEC-SCNC: 8.7 MG/DL (ref 8.6–10.5)
CHLORIDE SERPL-SCNC: 106 MMOL/L (ref 98–107)
CO2 SERPL-SCNC: 26.3 MMOL/L (ref 22–29)
CREAT SERPL-MCNC: 0.66 MG/DL (ref 0.57–1)
CRP SERPL-MCNC: 0.58 MG/DL (ref 0–0.5)
CRYPTOSP DNA STL QL NAA+NON-PROBE: NOT DETECTED
DEPRECATED RDW RBC AUTO: 40.9 FL (ref 37–54)
E HISTOLYT DNA STL QL NAA+NON-PROBE: NOT DETECTED
EAEC PAA PLAS AGGR+AATA ST NAA+NON-PRB: NOT DETECTED
EC STX1+STX2 GENES STL QL NAA+NON-PROBE: NOT DETECTED
EGFRCR SERPLBLD CKD-EPI 2021: 98.1 ML/MIN/1.73
EOSINOPHIL # BLD AUTO: 0.28 10*3/MM3 (ref 0–0.4)
EOSINOPHIL NFR BLD AUTO: 4.6 % (ref 0.3–6.2)
EPEC EAE GENE STL QL NAA+NON-PROBE: NOT DETECTED
ERYTHROCYTE [DISTWIDTH] IN BLOOD BY AUTOMATED COUNT: 11.9 % (ref 12.3–15.4)
ERYTHROCYTE [SEDIMENTATION RATE] IN BLOOD: 1 MM/HR (ref 0–30)
ETEC LTA+ST1A+ST1B TOX ST NAA+NON-PROBE: NOT DETECTED
G LAMBLIA DNA STL QL NAA+NON-PROBE: NOT DETECTED
GLOBULIN UR ELPH-MCNC: 2.3 GM/DL
GLUCOSE SERPL-MCNC: 100 MG/DL (ref 65–99)
HCT VFR BLD AUTO: 41.9 % (ref 34–46.6)
HGB BLD-MCNC: 14.3 G/DL (ref 12–15.9)
IMM GRANULOCYTES # BLD AUTO: 0.01 10*3/MM3 (ref 0–0.05)
IMM GRANULOCYTES NFR BLD AUTO: 0.2 % (ref 0–0.5)
LIPASE SERPL-CCNC: 35 U/L (ref 13–60)
LYMPHOCYTES # BLD AUTO: 1.32 10*3/MM3 (ref 0.7–3.1)
LYMPHOCYTES NFR BLD AUTO: 21.5 % (ref 19.6–45.3)
MAGNESIUM SERPL-MCNC: 2 MG/DL (ref 1.6–2.4)
MCH RBC QN AUTO: 31.7 PG (ref 26.6–33)
MCHC RBC AUTO-ENTMCNC: 34.1 G/DL (ref 31.5–35.7)
MCV RBC AUTO: 92.9 FL (ref 79–97)
MONOCYTES # BLD AUTO: 0.52 10*3/MM3 (ref 0.1–0.9)
MONOCYTES NFR BLD AUTO: 8.5 % (ref 5–12)
NEUTROPHILS NFR BLD AUTO: 3.94 10*3/MM3 (ref 1.7–7)
NEUTROPHILS NFR BLD AUTO: 64.2 % (ref 42.7–76)
NOROVIRUS GI+II RNA STL QL NAA+NON-PROBE: NOT DETECTED
NRBC BLD AUTO-RTO: 0 /100 WBC (ref 0–0.2)
P SHIGELLOIDES DNA STL QL NAA+NON-PROBE: NOT DETECTED
PLATELET # BLD AUTO: 287 10*3/MM3 (ref 140–450)
PMV BLD AUTO: 9.3 FL (ref 6–12)
POTASSIUM SERPL-SCNC: 4.3 MMOL/L (ref 3.5–5.2)
PROT SERPL-MCNC: 6.6 G/DL (ref 6–8.5)
RBC # BLD AUTO: 4.51 10*6/MM3 (ref 3.77–5.28)
RVA RNA STL QL NAA+NON-PROBE: NOT DETECTED
S ENT+BONG DNA STL QL NAA+NON-PROBE: NOT DETECTED
SAPO I+II+IV+V RNA STL QL NAA+NON-PROBE: NOT DETECTED
SHIGELLA SP+EIEC IPAH ST NAA+NON-PROBE: NOT DETECTED
SODIUM SERPL-SCNC: 142 MMOL/L (ref 136–145)
V CHOL+PARA+VUL DNA STL QL NAA+NON-PROBE: NOT DETECTED
V CHOLERAE DNA STL QL NAA+NON-PROBE: NOT DETECTED
WBC NRBC COR # BLD AUTO: 6.13 10*3/MM3 (ref 3.4–10.8)
Y ENTEROCOL DNA STL QL NAA+NON-PROBE: DETECTED

## 2025-08-04 PROCEDURE — 83735 ASSAY OF MAGNESIUM: CPT | Performed by: NURSE PRACTITIONER

## 2025-08-04 PROCEDURE — 99285 EMERGENCY DEPT VISIT HI MDM: CPT

## 2025-08-04 PROCEDURE — 74177 CT ABD & PELVIS W/CONTRAST: CPT | Performed by: RADIOLOGY

## 2025-08-04 PROCEDURE — 25810000003 LACTATED RINGERS SOLUTION: Performed by: NURSE PRACTITIONER

## 2025-08-04 PROCEDURE — 36415 COLL VENOUS BLD VENIPUNCTURE: CPT

## 2025-08-04 PROCEDURE — 80053 COMPREHEN METABOLIC PANEL: CPT | Performed by: EMERGENCY MEDICINE

## 2025-08-04 PROCEDURE — 25510000001 IOPAMIDOL 61 % SOLUTION: Performed by: EMERGENCY MEDICINE

## 2025-08-04 PROCEDURE — 83690 ASSAY OF LIPASE: CPT | Performed by: EMERGENCY MEDICINE

## 2025-08-04 PROCEDURE — 87507 IADNA-DNA/RNA PROBE TQ 12-25: CPT | Performed by: EMERGENCY MEDICINE

## 2025-08-04 PROCEDURE — 85025 COMPLETE CBC W/AUTO DIFF WBC: CPT | Performed by: EMERGENCY MEDICINE

## 2025-08-04 PROCEDURE — 74177 CT ABD & PELVIS W/CONTRAST: CPT

## 2025-08-04 PROCEDURE — 85652 RBC SED RATE AUTOMATED: CPT | Performed by: EMERGENCY MEDICINE

## 2025-08-04 PROCEDURE — 86140 C-REACTIVE PROTEIN: CPT | Performed by: EMERGENCY MEDICINE

## 2025-08-04 RX ORDER — SULFAMETHOXAZOLE AND TRIMETHOPRIM 800; 160 MG/1; MG/1
1 TABLET ORAL 2 TIMES DAILY
Qty: 14 TABLET | Refills: 0 | Status: SHIPPED | OUTPATIENT
Start: 2025-08-04 | End: 2025-08-11

## 2025-08-04 RX ORDER — SODIUM CHLORIDE 0.9 % (FLUSH) 0.9 %
10 SYRINGE (ML) INJECTION AS NEEDED
Status: DISCONTINUED | OUTPATIENT
Start: 2025-08-04 | End: 2025-08-04 | Stop reason: HOSPADM

## 2025-08-04 RX ORDER — IOPAMIDOL 612 MG/ML
85 INJECTION, SOLUTION INTRAVASCULAR
Status: COMPLETED | OUTPATIENT
Start: 2025-08-04 | End: 2025-08-04

## 2025-08-04 RX ORDER — DICYCLOMINE HYDROCHLORIDE 10 MG/ML
20 INJECTION INTRAMUSCULAR ONCE
Status: DISCONTINUED | OUTPATIENT
Start: 2025-08-04 | End: 2025-08-04 | Stop reason: HOSPADM

## 2025-08-04 RX ADMIN — SODIUM CHLORIDE, POTASSIUM CHLORIDE, SODIUM LACTATE AND CALCIUM CHLORIDE 1000 ML: 600; 310; 30; 20 INJECTION, SOLUTION INTRAVENOUS at 10:49

## 2025-08-04 RX ADMIN — IOPAMIDOL 85 ML: 612 INJECTION, SOLUTION INTRAVENOUS at 10:19

## 2025-08-15 ENCOUNTER — TRANSCRIBE ORDERS (OUTPATIENT)
Dept: ADMINISTRATIVE | Facility: HOSPITAL | Age: 65
End: 2025-08-15
Payer: COMMERCIAL

## 2025-08-15 DIAGNOSIS — K80.20 GALLSTONES: ICD-10-CM

## 2025-08-15 DIAGNOSIS — R10.84 ABDOMINAL PAIN, GENERALIZED: ICD-10-CM

## 2025-08-15 DIAGNOSIS — K52.9 CHRONIC DIARRHEA: Primary | ICD-10-CM
